# Patient Record
Sex: FEMALE | Race: WHITE | HISPANIC OR LATINO | URBAN - METROPOLITAN AREA
[De-identification: names, ages, dates, MRNs, and addresses within clinical notes are randomized per-mention and may not be internally consistent; named-entity substitution may affect disease eponyms.]

---

## 2018-06-19 ENCOUNTER — INPATIENT (INPATIENT)
Facility: HOSPITAL | Age: 73
LOS: 0 days | Discharge: HOME | DRG: 313 | End: 2018-06-20
Attending: INTERNAL MEDICINE | Admitting: INTERNAL MEDICINE
Payer: MEDICARE

## 2018-06-19 ENCOUNTER — TRANSCRIPTION ENCOUNTER (OUTPATIENT)
Age: 73
End: 2018-06-19

## 2018-06-19 VITALS
RESPIRATION RATE: 18 BRPM | HEART RATE: 60 BPM | TEMPERATURE: 98 F | DIASTOLIC BLOOD PRESSURE: 70 MMHG | SYSTOLIC BLOOD PRESSURE: 124 MMHG | OXYGEN SATURATION: 97 %

## 2018-06-19 VITALS
HEART RATE: 60 BPM | DIASTOLIC BLOOD PRESSURE: 70 MMHG | RESPIRATION RATE: 18 BRPM | OXYGEN SATURATION: 97 % | SYSTOLIC BLOOD PRESSURE: 127 MMHG | HEIGHT: 63 IN | TEMPERATURE: 98 F | WEIGHT: 223.99 LBS

## 2018-06-19 DIAGNOSIS — E78.5 HYPERLIPIDEMIA, UNSPECIFIED: ICD-10-CM

## 2018-06-19 DIAGNOSIS — I10 ESSENTIAL (PRIMARY) HYPERTENSION: ICD-10-CM

## 2018-06-19 DIAGNOSIS — Z96.653 PRESENCE OF ARTIFICIAL KNEE JOINT, BILATERAL: Chronic | ICD-10-CM

## 2018-06-19 DIAGNOSIS — I20.8 OTHER FORMS OF ANGINA PECTORIS: ICD-10-CM

## 2018-06-19 LAB
ALBUMIN SERPL ELPH-MCNC: 4.1 G/DL — SIGNIFICANT CHANGE UP (ref 3.3–5)
ALP SERPL-CCNC: 71 U/L — SIGNIFICANT CHANGE UP (ref 40–120)
ALT FLD-CCNC: 19 U/L — SIGNIFICANT CHANGE UP (ref 10–45)
ANION GAP SERPL CALC-SCNC: 11 MMOL/L — SIGNIFICANT CHANGE UP (ref 5–17)
APPEARANCE UR: CLEAR — SIGNIFICANT CHANGE UP
AST SERPL-CCNC: 24 U/L — SIGNIFICANT CHANGE UP (ref 10–40)
BASOPHILS NFR BLD AUTO: 0.3 % — SIGNIFICANT CHANGE UP (ref 0–2)
BILIRUB SERPL-MCNC: 0.6 MG/DL — SIGNIFICANT CHANGE UP (ref 0.2–1.2)
BILIRUB UR-MCNC: NEGATIVE — SIGNIFICANT CHANGE UP
BUN SERPL-MCNC: 25 MG/DL — HIGH (ref 7–23)
CALCIUM SERPL-MCNC: 9.4 MG/DL — SIGNIFICANT CHANGE UP (ref 8.4–10.5)
CHLORIDE SERPL-SCNC: 101 MMOL/L — SIGNIFICANT CHANGE UP (ref 96–108)
CK MB CFR SERPL CALC: 1.5 NG/ML — SIGNIFICANT CHANGE UP (ref 0–6.7)
CK SERPL-CCNC: 62 U/L — SIGNIFICANT CHANGE UP (ref 25–170)
CO2 SERPL-SCNC: 27 MMOL/L — SIGNIFICANT CHANGE UP (ref 22–31)
COLOR SPEC: YELLOW — SIGNIFICANT CHANGE UP
CREAT SERPL-MCNC: 0.88 MG/DL — SIGNIFICANT CHANGE UP (ref 0.5–1.3)
DIFF PNL FLD: ABNORMAL
EOSINOPHIL NFR BLD AUTO: 1.4 % — SIGNIFICANT CHANGE UP (ref 0–6)
GLUCOSE SERPL-MCNC: 94 MG/DL — SIGNIFICANT CHANGE UP (ref 70–99)
GLUCOSE UR QL: NEGATIVE — SIGNIFICANT CHANGE UP
HCT VFR BLD CALC: 41.5 % — SIGNIFICANT CHANGE UP (ref 34.5–45)
HGB BLD-MCNC: 14 G/DL — SIGNIFICANT CHANGE UP (ref 11.5–15.5)
KETONES UR-MCNC: NEGATIVE — SIGNIFICANT CHANGE UP
LEUKOCYTE ESTERASE UR-ACNC: ABNORMAL
LYMPHOCYTES # BLD AUTO: 21.4 % — SIGNIFICANT CHANGE UP (ref 13–44)
MCHC RBC-ENTMCNC: 29 PG — SIGNIFICANT CHANGE UP (ref 27–34)
MCHC RBC-ENTMCNC: 33.7 G/DL — SIGNIFICANT CHANGE UP (ref 32–36)
MCV RBC AUTO: 86.1 FL — SIGNIFICANT CHANGE UP (ref 80–100)
MONOCYTES NFR BLD AUTO: 7.6 % — SIGNIFICANT CHANGE UP (ref 2–14)
NEUTROPHILS NFR BLD AUTO: 69.3 % — SIGNIFICANT CHANGE UP (ref 43–77)
NITRITE UR-MCNC: NEGATIVE — SIGNIFICANT CHANGE UP
NT-PROBNP SERPL-SCNC: 96 PG/ML — SIGNIFICANT CHANGE UP (ref 0–300)
PH UR: 6 — SIGNIFICANT CHANGE UP (ref 5–8)
PLATELET # BLD AUTO: 171 K/UL — SIGNIFICANT CHANGE UP (ref 150–400)
POTASSIUM SERPL-MCNC: 4.8 MMOL/L — SIGNIFICANT CHANGE UP (ref 3.5–5.3)
POTASSIUM SERPL-SCNC: 4.8 MMOL/L — SIGNIFICANT CHANGE UP (ref 3.5–5.3)
PROT SERPL-MCNC: 7.4 G/DL — SIGNIFICANT CHANGE UP (ref 6–8.3)
PROT UR-MCNC: NEGATIVE MG/DL — SIGNIFICANT CHANGE UP
RBC # BLD: 4.82 M/UL — SIGNIFICANT CHANGE UP (ref 3.8–5.2)
RBC # FLD: 14.3 % — SIGNIFICANT CHANGE UP (ref 10.3–16.9)
SODIUM SERPL-SCNC: 139 MMOL/L — SIGNIFICANT CHANGE UP (ref 135–145)
SP GR SPEC: <=1.005 — SIGNIFICANT CHANGE UP (ref 1–1.03)
TROPONIN T SERPL-MCNC: <0.01 NG/ML — SIGNIFICANT CHANGE UP (ref 0–0.01)
TROPONIN T SERPL-MCNC: <0.01 NG/ML — SIGNIFICANT CHANGE UP (ref 0–0.01)
TSH SERPL-MCNC: 1.59 UIU/ML — SIGNIFICANT CHANGE UP (ref 0.35–4.94)
UROBILINOGEN FLD QL: 0.2 E.U./DL — SIGNIFICANT CHANGE UP
WBC # BLD: 8.6 K/UL — SIGNIFICANT CHANGE UP (ref 3.8–10.5)
WBC # FLD AUTO: 8.6 K/UL — SIGNIFICANT CHANGE UP (ref 3.8–10.5)

## 2018-06-19 PROCEDURE — 99285 EMERGENCY DEPT VISIT HI MDM: CPT | Mod: 25

## 2018-06-19 PROCEDURE — 75574 CT ANGIO HRT W/3D IMAGE: CPT | Mod: 26

## 2018-06-19 PROCEDURE — 93010 ELECTROCARDIOGRAM REPORT: CPT

## 2018-06-19 PROCEDURE — 71045 X-RAY EXAM CHEST 1 VIEW: CPT | Mod: 26

## 2018-06-19 RX ORDER — PROPRANOLOL HCL 160 MG
120 CAPSULE, EXTENDED RELEASE 24HR ORAL DAILY
Qty: 0 | Refills: 0 | Status: DISCONTINUED | OUTPATIENT
Start: 2018-06-19 | End: 2018-06-20

## 2018-06-19 RX ORDER — LOSARTAN POTASSIUM 100 MG/1
100 TABLET, FILM COATED ORAL DAILY
Qty: 0 | Refills: 0 | Status: DISCONTINUED | OUTPATIENT
Start: 2018-06-19 | End: 2018-06-20

## 2018-06-19 RX ORDER — METOPROLOL TARTRATE 50 MG
25 TABLET ORAL ONCE
Qty: 0 | Refills: 0 | Status: COMPLETED | OUTPATIENT
Start: 2018-06-19 | End: 2018-06-19

## 2018-06-19 RX ORDER — ATORVASTATIN CALCIUM 80 MG/1
20 TABLET, FILM COATED ORAL AT BEDTIME
Qty: 0 | Refills: 0 | Status: DISCONTINUED | OUTPATIENT
Start: 2018-06-19 | End: 2018-06-20

## 2018-06-19 RX ADMIN — Medication 25 MILLIGRAM(S): at 15:49

## 2018-06-19 NOTE — ED ADULT NURSE NOTE - OBJECTIVE STATEMENT
Pt w/ PMH of HLD and HTN presents to ED c/o intermittent CP x2 weeks worse on exertion.  Pt also elicits SOB when walking up stairs.  Pt denies dizziness, N/V, chills/fever, cough or unusual swelling.  Pt is pending labs and eval by [facility].

## 2018-06-19 NOTE — ED ADULT NURSE NOTE - CHPI ED SYMPTOMS NEG
no back pain/no syncope/no chills/no dizziness/no fever/no vomiting/no diaphoresis/no chest pain/no nausea/no cough

## 2018-06-19 NOTE — DISCHARGE NOTE ADULT - PATIENT PORTAL LINK FT
You can access the [facility] Patient Portal, offered by [facility], by registering with the following website: [email or web address]

## 2018-06-19 NOTE — H&P ADULT - PROBLEM SELECTOR PLAN 1
currently chest pain free, EKG LBBB, cardiac enzymes negative x 1 set. NPO for CT Angio Coronaries. currently chest pain free, EKG LBBB, cardiac enzymes negative x 1 set. NPO for CT Angio Coronaries this evening, if abnormal plan for cardiac catheterization with Dr. Whittington in AM. Will F/U CTA results and inform Dr. Whittington. currently chest pain free, EKG LBBB, cardiac enzymes negative x 1 set. NPO for CT Angio Coronaries this evening, will F/U CTA results and inform Dr. Whittington.

## 2018-06-19 NOTE — DISCHARGE NOTE ADULT - CARE PLAN
Principal Discharge DX:	Anginal chest pain at rest  Goal:	Your chest pain is likely noncardiac in origin. You had an EKG which revealed a left bundle branch block, however your cardiac markers were negative for injury to the muscle of the heart.  Assessment and plan of treatment:	You also had a CT Angiogram of your coronary arteries which revealed calcium score is minimally elevated at 2 Agatston units, which is at the 38th percentile, adjusted for age, gender and race. Non-obstructive lesion in the proximal left circumflex artery. Remaining coronary arteries are normal.  --Continue to follow-up with your cardiologist and see your PMD to evaluate noncardiac origin of your symptoms.  Secondary Diagnosis:	HTN (hypertension)  Goal:	Continue to follow low sodium diet.  Assessment and plan of treatment:	--Continue Inderall 120mg by mouth daily and Benicar 40mg by mouth daily  Secondary Diagnosis:	HLD (hyperlipidemia)  Assessment and plan of treatment:	--Continue Lipitor 20mg by mouth at bedtime

## 2018-06-19 NOTE — H&P ADULT - HISTORY OF PRESENT ILLNESS
73 yr old obese F with PMHx of HTN, hyperlipidemia, presents to [facility] ED 6/19/18 c/o intermittent exertional chest pain and SOB x 2 weeks.  Patient describes it as being nonradiating left sided chest pressure 5/10 in severity, associated with SOB. Symptoms brought on by exertion (ambulating >1 city block or climbing 10 steps). Patient denies any N/V, diaphoresis, dizziness, palpitations, PND, orthopnea or LE edema. Patient was seen a few days ago by Dr. Singh was noted to have new LBBB on EKG and was recommended to come to ED, however patient waited until symptoms reoccurred today. Patient denies recent cardiac work-up.   In ED, BP: 127/70, HR: 60, RR:18, Temp: 98F, O2 sat: 97% RA. EKG revealed SR@60BPM with LBBB. Cardiac enzymes negative x 1 set. CXR unremarkable.   Patient currently stable and now admitted to [facility] for cardiac telemetry, serial enzymes and further cardiac work-up. 73 yr old obese F with PMHx of HTN, hyperlipidemia, presents to [facility] ED 6/19/18 c/o intermittent chest pain and SOB x 2 weeks. Patient describes it as being nonradiating left sided chest pressure 5/10 in severity, associated with SOB. Symptoms initially brought on by exertion (ambulating >1 city block or climbing 10 steps), however recently also occurring at rest. Patient denies any N/V, diaphoresis, dizziness, palpitations, PND, orthopnea or LE edema. Patient was seen a few days ago by Dr. Singh was noted to have new LBBB on EKG and was recommended to come to ED, however patient waited until symptoms reoccurred today. Patient denies recent cardiac work-up, states last stress test was ~2 yrs ago was "normal".  In ED, BP: 127/70, HR: 60, RR:18, Temp: 98F, O2 sat: 97% RA. EKG revealed SR@60BPM with LBBB. Cardiac enzymes negative x 1 set. CXR unremarkable.   Patient currently stable and now admitted to [facility] for cardiac telemetry, serial enzymes and further cardiac work-up.

## 2018-06-19 NOTE — DISCHARGE NOTE ADULT - HOSPITAL COURSE
73 yr old obese F with PMHx of HTN, hyperlipidemia, presents to [facility] ED 6/19/18 c/o intermittent chest pain and SOB x 2 weeks. Patient describes it as being nonradiating left sided chest pressure 5/10 in severity, associated with SOB. Symptoms initially brought on by exertion (ambulating >1 city block or climbing 10 steps), however recently also occurring at rest. Patient denies any N/V, diaphoresis, dizziness, palpitations, PND, orthopnea or LE edema. Patient was seen a few days ago by Dr. Singh was noted to have new LBBB on EKG and was recommended to come to ED, however patient waited until symptoms reoccurred today. Patient denies recent cardiac work-up, states last stress test was ~2 yrs ago was "normal".  In ED, BP: 127/70, HR: 60, RR:18, Temp: 98F, O2 sat: 97% RA. EKG revealed SR@60BPM with LBBB. Cardiac enzymes negative x 1 set. CXR unremarkable. Patient subsequently underwent a CT Angio of Coronaries which revealed calcium score is minimally elevated at 2 Agatston units, which is at the 38th percentile, adjusted for age, gender and race.Non-obstructive stenosis in the proximal LCX. Remaining coronary segments appear normal  Patients symptoms likely noncardiac in origin and deemed stable for discharge as per Dr. Whittington and to follow-up with Dr. Singh in 1-2 weeks.

## 2018-06-19 NOTE — ED ADULT TRIAGE NOTE - CHIEF COMPLAINT QUOTE
pt states she has been feeling tired over the past 5 days, also reports palpitations when walking. denies sob, cp, dizziness.

## 2018-06-19 NOTE — ED ADULT NURSE NOTE - CHIEF COMPLAINT QUOTE
pt states she has been feeling tired over the past 5 days, also reports palpitations when walking. denies sob, cp, dizziness.
Simple: Patient demonstrates quick and easy understanding/Verbalized Understanding

## 2018-06-19 NOTE — ED PROVIDER NOTE - PHYSICAL EXAMINATION
VITAL SIGNS: I have reviewed nursing notes and confirm.  CONSTITUTIONAL: Well-developed; well-nourished obese elderly female lying comfortably in stretcher moving all ext speaking clearly in complete sentences; in no acute distress.  SKIN: Agree with RN documentation regarding decubitus evaluation. Remainder of skin exam is warm and dry, no acute rash.  HEAD: Normocephalic; atraumatic.  EYES: PERRL, EOM intact; conjunctiva and sclera clear.  ENT: No nasal discharge; airway clear.  NECK: Supple; non tender.  CARD: S1, S2 normal; no murmurs, gallops, or rubs. RRR  RESP: No wheezes, rales or rhonchi. CTA w/good excursion, no inc wob  ABD: Normal bowel sounds; soft; non-distended; non-tender  EXT: Normal ROM. No clubbing, cyanosis or edema.  LYMPH: No acute cervical adenopathy.  NEURO: Alert, oriented. Grossly unremarkable.  PSYCH: Cooperative, appropriate.

## 2018-06-19 NOTE — ED PROVIDER NOTE - OBJECTIVE STATEMENT
72 y/o F w/hx HLD, HTN, obesity, non-smoker w/no known cardiac disease, last Stress Test 2 yrs ago, p/w intermittent L-sided chest pressure on/off for 2 weeks, exertional, with associated SOB while walking up stairs. Notes mild decreased exercise tolerance. No LE pain/swelling. No n/v or abd pain. No fever/chills/cough or recent illness. Seen at PMD's office 4 days ago, Dr. Singh, with noted LBBB, unsure if new or not, referred to ED for further evaluation. However, pt began to feel better so did not immediately seek further evaluation. Today pt's pressure sx are more prominent, present at time of interview.

## 2018-06-19 NOTE — ED PROVIDER NOTE - MEDICAL DECISION MAKING DETAILS
+ active chest discomfort w/new LBBB and cardiac risk factors, negative trop, HDS, admitting for cardiac CTA and possible catheterization. Given ASA. Admitting to [facility] service.

## 2018-06-19 NOTE — DISCHARGE NOTE ADULT - MEDICATION SUMMARY - MEDICATIONS TO TAKE
I will START or STAY ON the medications listed below when I get home from the hospital:    Benicar 40 mg oral tablet  -- 1 tab(s) by mouth once a day  -- Indication: For Blood pressure    Inderal  mg oral capsule, extended release  -- 1 cap(s) by mouth once a day  -- Indication: For Blood pressure    Lipitor 20 mg oral tablet  -- 1 tab(s) by mouth once a day  -- Indication: For Cholesterol

## 2018-06-19 NOTE — DISCHARGE NOTE ADULT - PLAN OF CARE
Your chest pain is likely noncardiac in origin. You had an EKG which revealed a left bundle branch block, however your cardiac markers were negative for injury to the muscle of the heart. You also had a CT Angiogram of your coronary arteries which revealed calcium score is minimally elevated at 2 Agatston units, which is at the 38th percentile, adjusted for age, gender and race. Non-obstructive lesion in the proximal left circumflex artery. Remaining coronary arteries are normal.  --Continue to follow-up with your cardiologist and see your PMD to evaluate noncardiac origin of your symptoms. Continue to follow low sodium diet. --Continue Inderall 120mg by mouth daily and Benicar 40mg by mouth daily --Continue Lipitor 20mg by mouth at bedtime

## 2018-06-20 PROCEDURE — 85025 COMPLETE CBC W/AUTO DIFF WBC: CPT

## 2018-06-20 PROCEDURE — 80053 COMPREHEN METABOLIC PANEL: CPT

## 2018-06-20 PROCEDURE — 84484 ASSAY OF TROPONIN QUANT: CPT

## 2018-06-20 PROCEDURE — G0378: CPT

## 2018-06-20 PROCEDURE — 82550 ASSAY OF CK (CPK): CPT

## 2018-06-20 PROCEDURE — 36415 COLL VENOUS BLD VENIPUNCTURE: CPT

## 2018-06-20 PROCEDURE — 75574 CT ANGIO HRT W/3D IMAGE: CPT

## 2018-06-20 PROCEDURE — 83880 ASSAY OF NATRIURETIC PEPTIDE: CPT

## 2018-06-20 PROCEDURE — 71045 X-RAY EXAM CHEST 1 VIEW: CPT

## 2018-06-20 PROCEDURE — 81001 URINALYSIS AUTO W/SCOPE: CPT

## 2018-06-20 PROCEDURE — 84443 ASSAY THYROID STIM HORMONE: CPT

## 2018-06-20 PROCEDURE — 99285 EMERGENCY DEPT VISIT HI MDM: CPT | Mod: 25

## 2018-06-20 PROCEDURE — 93005 ELECTROCARDIOGRAM TRACING: CPT

## 2018-06-20 PROCEDURE — 82553 CREATINE MB FRACTION: CPT

## 2018-06-25 DIAGNOSIS — E66.9 OBESITY, UNSPECIFIED: ICD-10-CM

## 2018-06-25 DIAGNOSIS — R07.89 OTHER CHEST PAIN: ICD-10-CM

## 2018-06-25 DIAGNOSIS — I10 ESSENTIAL (PRIMARY) HYPERTENSION: ICD-10-CM

## 2018-06-25 DIAGNOSIS — I44.7 LEFT BUNDLE-BRANCH BLOCK, UNSPECIFIED: ICD-10-CM

## 2018-09-19 ENCOUNTER — OTHER (OUTPATIENT)
Age: 73
End: 2018-09-19

## 2020-10-13 PROBLEM — I10 ESSENTIAL (PRIMARY) HYPERTENSION: Chronic | Status: ACTIVE | Noted: 2018-06-19

## 2020-10-13 PROBLEM — E78.5 HYPERLIPIDEMIA, UNSPECIFIED: Chronic | Status: ACTIVE | Noted: 2018-06-19

## 2020-10-13 PROBLEM — K57.92 DIVERTICULITIS OF INTESTINE, PART UNSPECIFIED, WITHOUT PERFORATION OR ABSCESS WITHOUT BLEEDING: Chronic | Status: ACTIVE | Noted: 2018-06-19

## 2020-10-22 ENCOUNTER — NON-APPOINTMENT (OUTPATIENT)
Age: 75
End: 2020-10-22

## 2020-10-22 ENCOUNTER — LABORATORY RESULT (OUTPATIENT)
Age: 75
End: 2020-10-22

## 2020-10-22 ENCOUNTER — APPOINTMENT (OUTPATIENT)
Dept: INTERNAL MEDICINE | Facility: CLINIC | Age: 75
End: 2020-10-22
Payer: MEDICARE

## 2020-10-22 VITALS
SYSTOLIC BLOOD PRESSURE: 145 MMHG | BODY MASS INDEX: 42.36 KG/M2 | OXYGEN SATURATION: 97 % | DIASTOLIC BLOOD PRESSURE: 76 MMHG | HEART RATE: 64 BPM | TEMPERATURE: 96.6 F | WEIGHT: 227.28 LBS | HEIGHT: 61.42 IN

## 2020-10-22 DIAGNOSIS — Z78.9 OTHER SPECIFIED HEALTH STATUS: ICD-10-CM

## 2020-10-22 DIAGNOSIS — Z23 ENCOUNTER FOR IMMUNIZATION: ICD-10-CM

## 2020-10-22 PROCEDURE — 90662 IIV NO PRSV INCREASED AG IM: CPT

## 2020-10-22 PROCEDURE — 99072 ADDL SUPL MATRL&STAF TM PHE: CPT

## 2020-10-22 PROCEDURE — G0008: CPT

## 2020-10-22 PROCEDURE — G0439: CPT

## 2020-10-22 PROCEDURE — 36415 COLL VENOUS BLD VENIPUNCTURE: CPT

## 2020-10-27 PROBLEM — Z78.9 CURRENT NON-SMOKER: Status: ACTIVE | Noted: 2020-10-27

## 2020-10-27 PROBLEM — Z23 ENCOUNTER FOR IMMUNIZATION: Status: ACTIVE | Noted: 2020-10-27

## 2020-10-27 PROBLEM — Z78.9 CONSUMES ALCOHOL OCCASIONALLY: Status: ACTIVE | Noted: 2020-10-27

## 2020-10-27 PROBLEM — Z78.9 KNOWN HEALTH PROBLEMS: NONE: Status: RESOLVED | Noted: 2020-10-27 | Resolved: 2020-10-27

## 2020-10-27 NOTE — HISTORY OF PRESENT ILLNESS
[FreeTextEntry1] : 75-year-old female presents for Medicare annual wellness [de-identified] : 75-year-old female past medical history of diverticulitis and hypertension presents for Medicare annual wellness visit. Watching diet eating high fiber, denies any abdominal pain diarrhea nausea vomiting. Denies any blood in the stool. [place] for hypertension denies any chest pain chest tightness shortness of breath palpitations

## 2020-10-27 NOTE — HEALTH RISK ASSESSMENT
[Good] : ~his/her~  mood as  good [No] : No [No falls in past year] : Patient reported no falls in the past year [0] : 2) Feeling down, depressed, or hopeless: Not at all (0) [Patient reported mammogram was normal] : Patient reported mammogram was normal [Patient reported bone density results were normal] : Patient reported bone density results were normal [Patient reported colonoscopy was normal] : Patient reported colonoscopy was normal [None] : None [With Significant Other] : lives with significant other [Feels Safe at Home] : Feels safe at home [Fully functional (bathing, dressing, toileting, transferring, walking, feeding)] : Fully functional (bathing, dressing, toileting, transferring, walking, feeding) [Fully functional (using the telephone, shopping, preparing meals, housekeeping, doing laundry, using] : Fully functional and needs no help or supervision to perform IADLs (using the telephone, shopping, preparing meals, housekeeping, doing laundry, using transportation, managing medications and managing finances) [Reports normal functional visual acuity (ie: able to read med bottle)] : Reports normal functional visual acuity [Smoke Detector] : smoke detector [Carbon Monoxide Detector] : carbon monoxide detector [Seat Belt] :  uses seat belt [Sunscreen] : uses sunscreen [Caregiver Concerns] : has caregiver concerns [Patient/Caregiver not ready to engage] : Patient/Caregiver not ready to engage [Designated Healthcare Proxy] : Designated healthcare proxy [FreeTextEntry1] : health maintenance [] : No [de-identified] : none [de-identified] : cardiology [Change in mental status noted] : No change in mental status noted [Language] : denies difficulty with language [Behavior] : denies difficulty with behavior [Learning/Retaining New Information] : denies difficulty learning/retaining new information [Handling Complex Tasks] : denies difficulty handling complex tasks [Reasoning] : denies difficulty with reasoning [Spatial Ability and Orientation] : denies difficulty with spatial ability and orientation [Reports changes in hearing] : Reports no changes in hearing [Reports changes in vision] : Reports no changes in vision [Reports changes in dental health] : Reports no changes in dental health [Guns at Home] : no guns at home [Travel to Developing Areas] : does not  travel to developing areas [TB Exposure] : is not being exposed to tuberculosis [MammogramDate] : 1/2020 [BoneDensityDate] : 1/2020 [ColonoscopyDate] : 1/2018 [AdvancecareDate] : 10/2020

## 2020-10-27 NOTE — ASSESSMENT
[FreeTextEntry1] : Patient presents for Medicare annual wellness visit, blood work done at the office today blood pressure acceptable, check other risk factors including lipid panel and hemoglobin A1c. Up to date with colonoscopy given diverticulitis advised high-fiber diet. To retrieve old records for pneumonia vaccines

## 2020-10-29 LAB
25(OH)D3 SERPL-MCNC: 27.2 NG/ML
ALBUMIN SERPL ELPH-MCNC: 4.7 G/DL
ALP BLD-CCNC: 103 U/L
ALT SERPL-CCNC: 15 U/L
ANION GAP SERPL CALC-SCNC: 16 MMOL/L
APPEARANCE: CLEAR
APPEARANCE: CLEAR
AST SERPL-CCNC: 17 U/L
BACTERIA: ABNORMAL
BASOPHILS # BLD AUTO: 0.03 K/UL
BASOPHILS NFR BLD AUTO: 0.3 %
BILIRUB SERPL-MCNC: 0.5 MG/DL
BILIRUBIN URINE: NEGATIVE
BILIRUBIN URINE: NEGATIVE
BLOOD URINE: NORMAL
BLOOD URINE: NORMAL
BUN SERPL-MCNC: 22 MG/DL
CALCIUM SERPL-MCNC: 10 MG/DL
CHLORIDE SERPL-SCNC: 99 MMOL/L
CHOLEST SERPL-MCNC: 160 MG/DL
CO2 SERPL-SCNC: 25 MMOL/L
COLOR: NORMAL
COLOR: NORMAL
CREAT SERPL-MCNC: 0.88 MG/DL
EOSINOPHIL # BLD AUTO: 0.19 K/UL
EOSINOPHIL NFR BLD AUTO: 2.1 %
ESTIMATED AVERAGE GLUCOSE: 128 MG/DL
GLUCOSE QUALITATIVE U: NEGATIVE
GLUCOSE QUALITATIVE U: NEGATIVE
GLUCOSE SERPL-MCNC: 84 MG/DL
HBA1C MFR BLD HPLC: 6.1 %
HCT VFR BLD CALC: 50.5 %
HDLC SERPL-MCNC: 36 MG/DL
HGB BLD-MCNC: 15 G/DL
HYALINE CASTS: 0 /LPF
IMM GRANULOCYTES NFR BLD AUTO: 0.2 %
KETONES URINE: NEGATIVE
KETONES URINE: NEGATIVE
LDLC SERPL CALC-MCNC: 81 MG/DL
LEUKOCYTE ESTERASE URINE: ABNORMAL
LEUKOCYTE ESTERASE URINE: ABNORMAL
LYMPHOCYTES # BLD AUTO: 1.61 K/UL
LYMPHOCYTES NFR BLD AUTO: 17.6 %
MAN DIFF?: NORMAL
MCHC RBC-ENTMCNC: 27.4 PG
MCHC RBC-ENTMCNC: 29.7 GM/DL
MCV RBC AUTO: 92.2 FL
MICROSCOPIC-UA: NORMAL
MONOCYTES # BLD AUTO: 0.91 K/UL
MONOCYTES NFR BLD AUTO: 9.9 %
NEUTROPHILS # BLD AUTO: 6.4 K/UL
NEUTROPHILS NFR BLD AUTO: 69.9 %
NITRITE URINE: POSITIVE
NITRITE URINE: POSITIVE
NONHDLC SERPL-MCNC: 123 MG/DL
PH URINE: 6
PH URINE: 6
PLATELET # BLD AUTO: 220 K/UL
POTASSIUM SERPL-SCNC: 4.9 MMOL/L
PROT SERPL-MCNC: 7.3 G/DL
PROTEIN URINE: NEGATIVE
PROTEIN URINE: NEGATIVE
RBC # BLD: 5.48 M/UL
RBC # FLD: 14.6 %
RED BLOOD CELLS URINE: 2 /HPF
SODIUM SERPL-SCNC: 140 MMOL/L
SPECIFIC GRAVITY URINE: 1.01
SPECIFIC GRAVITY URINE: 1.01
SQUAMOUS EPITHELIAL CELLS: 2 /HPF
TRIGL SERPL-MCNC: 213 MG/DL
TSH SERPL-ACNC: 3.15 UIU/ML
UROBILINOGEN URINE: NORMAL
UROBILINOGEN URINE: NORMAL
VIT B12 SERPL-MCNC: 542 PG/ML
WBC # FLD AUTO: 9.16 K/UL
WHITE BLOOD CELLS URINE: 10 /HPF

## 2021-01-10 NOTE — ED ADULT TRIAGE NOTE - AS TEMP SITE
Patient is resting comfortably with eyes closed and no distress noted at this time. Patient has call light within reach and is encouraged to ask any questions or call for any assistance. All questions are entertained, encouraged, and answered to the best of my ability.       Geoffrey Mock RN  01/10/21 7100 oral

## 2021-04-20 ENCOUNTER — APPOINTMENT (OUTPATIENT)
Dept: INTERNAL MEDICINE | Facility: CLINIC | Age: 76
End: 2021-04-20
Payer: MEDICARE

## 2021-04-20 ENCOUNTER — LABORATORY RESULT (OUTPATIENT)
Age: 76
End: 2021-04-20

## 2021-04-20 VITALS
WEIGHT: 220.88 LBS | BODY MASS INDEX: 42.8 KG/M2 | TEMPERATURE: 97.8 F | HEART RATE: 61 BPM | SYSTOLIC BLOOD PRESSURE: 147 MMHG | HEIGHT: 60.04 IN | DIASTOLIC BLOOD PRESSURE: 80 MMHG | OXYGEN SATURATION: 98 %

## 2021-04-20 DIAGNOSIS — R10.11 RIGHT UPPER QUADRANT PAIN: ICD-10-CM

## 2021-04-20 PROCEDURE — 99214 OFFICE O/P EST MOD 30 MIN: CPT | Mod: 25

## 2021-04-20 PROCEDURE — 36415 COLL VENOUS BLD VENIPUNCTURE: CPT

## 2021-04-20 PROCEDURE — 99072 ADDL SUPL MATRL&STAF TM PHE: CPT

## 2021-04-24 NOTE — HISTORY OF PRESENT ILLNESS
[FreeTextEntry1] : Patient presents for follow-up of chronic disease management [de-identified] : Sometimes gets palpitations triggered intermittently does not wake up from sleep not associated with lightheadedness dizziness chest pain chest tightness.  Denies any syncope.  Denies any caffeine use sometimes also gets abdominal pain, in the right upper quadrant area does not radiate sometimes associated with food denies any nausea vomiting melena.  To date with colonoscopy. Recently had workup with cardiologist.

## 2021-04-24 NOTE — ASSESSMENT
[FreeTextEntry1] : We will check electrolytes and magnesium for palpitations has seen cardiologist, no associated symptoms right upper quadrant pain Covid ultrasound H. pylori to be checked.  We will check thyroid function tests.  Discussed importance of stress management.

## 2021-04-26 DIAGNOSIS — A04.8 OTHER SPECIFIED BACTERIAL INTESTINAL INFECTIONS: ICD-10-CM

## 2021-04-26 LAB
25(OH)D3 SERPL-MCNC: 37.5 NG/ML
ALBUMIN SERPL ELPH-MCNC: 4.5 G/DL
ALP BLD-CCNC: 88 U/L
ALT SERPL-CCNC: 15 U/L
ANION GAP SERPL CALC-SCNC: 13 MMOL/L
APPEARANCE: CLEAR
APPEARANCE: CLEAR
AST SERPL-CCNC: 18 U/L
BACTERIA: ABNORMAL
BASOPHILS # BLD AUTO: 0.06 K/UL
BASOPHILS NFR BLD AUTO: 0.8 %
BILIRUB SERPL-MCNC: 0.4 MG/DL
BILIRUBIN URINE: NEGATIVE
BILIRUBIN URINE: NEGATIVE
BLOOD URINE: NEGATIVE
BLOOD URINE: NEGATIVE
BUN SERPL-MCNC: 31 MG/DL
CALCIUM SERPL-MCNC: 10 MG/DL
CHLORIDE SERPL-SCNC: 101 MMOL/L
CHOLEST SERPL-MCNC: 143 MG/DL
CO2 SERPL-SCNC: 25 MMOL/L
COLOR: NORMAL
COLOR: NORMAL
CREAT SERPL-MCNC: 0.93 MG/DL
EOSINOPHIL # BLD AUTO: 0.13 K/UL
EOSINOPHIL NFR BLD AUTO: 1.7 %
ESTIMATED AVERAGE GLUCOSE: 117 MG/DL
GLUCOSE QUALITATIVE U: NEGATIVE
GLUCOSE QUALITATIVE U: NEGATIVE
GLUCOSE SERPL-MCNC: 79 MG/DL
HBA1C MFR BLD HPLC: 5.7 %
HCT VFR BLD CALC: 47.4 %
HDLC SERPL-MCNC: 39 MG/DL
HGB BLD-MCNC: 14.8 G/DL
HYALINE CASTS: 1 /LPF
IMM GRANULOCYTES NFR BLD AUTO: 0.3 %
KETONES URINE: NEGATIVE
KETONES URINE: NEGATIVE
LDLC SERPL CALC-MCNC: 78 MG/DL
LEUKOCYTE ESTERASE URINE: ABNORMAL
LEUKOCYTE ESTERASE URINE: ABNORMAL
LYMPHOCYTES # BLD AUTO: 1.69 K/UL
LYMPHOCYTES NFR BLD AUTO: 22.7 %
MAGNESIUM SERPL-MCNC: 2 MG/DL
MAN DIFF?: NORMAL
MCHC RBC-ENTMCNC: 28.2 PG
MCHC RBC-ENTMCNC: 31.2 GM/DL
MCV RBC AUTO: 90.3 FL
MICROSCOPIC-UA: NORMAL
MONOCYTES # BLD AUTO: 0.71 K/UL
MONOCYTES NFR BLD AUTO: 9.5 %
NEUTROPHILS # BLD AUTO: 4.85 K/UL
NEUTROPHILS NFR BLD AUTO: 65 %
NITRITE URINE: POSITIVE
NITRITE URINE: POSITIVE
NONHDLC SERPL-MCNC: 104 MG/DL
PH URINE: 6
PH URINE: 6
PLATELET # BLD AUTO: 195 K/UL
POTASSIUM SERPL-SCNC: 5.2 MMOL/L
PROT SERPL-MCNC: 7.2 G/DL
PROTEIN URINE: NEGATIVE
PROTEIN URINE: NEGATIVE
RBC # BLD: 5.25 M/UL
RBC # FLD: 15.1 %
RED BLOOD CELLS URINE: 2 /HPF
SODIUM SERPL-SCNC: 140 MMOL/L
SPECIFIC GRAVITY URINE: 1.02
SPECIFIC GRAVITY URINE: 1.02
SQUAMOUS EPITHELIAL CELLS: 3 /HPF
TRIGL SERPL-MCNC: 130 MG/DL
TSH SERPL-ACNC: 2.82 UIU/ML
TSH SERPL-ACNC: 2.82 UIU/ML
UREA BREATH TEST QL: POSITIVE
UROBILINOGEN URINE: NORMAL
UROBILINOGEN URINE: NORMAL
VIT B12 SERPL-MCNC: 427 PG/ML
WBC # FLD AUTO: 7.46 K/UL
WHITE BLOOD CELLS URINE: 7 /HPF

## 2021-04-28 ENCOUNTER — APPOINTMENT (OUTPATIENT)
Dept: ULTRASOUND IMAGING | Facility: CLINIC | Age: 76
End: 2021-04-28

## 2021-08-20 ENCOUNTER — APPOINTMENT (OUTPATIENT)
Dept: INTERNAL MEDICINE | Facility: CLINIC | Age: 76
End: 2021-08-20

## 2021-09-16 ENCOUNTER — APPOINTMENT (OUTPATIENT)
Dept: INTERNAL MEDICINE | Facility: CLINIC | Age: 76
End: 2021-09-16
Payer: MEDICARE

## 2021-09-16 VITALS
TEMPERATURE: 96.1 F | WEIGHT: 217.03 LBS | HEART RATE: 65 BPM | BODY MASS INDEX: 42.61 KG/M2 | DIASTOLIC BLOOD PRESSURE: 76 MMHG | OXYGEN SATURATION: 97 % | SYSTOLIC BLOOD PRESSURE: 155 MMHG | HEIGHT: 59.84 IN

## 2021-09-16 PROCEDURE — 99213 OFFICE O/P EST LOW 20 MIN: CPT

## 2021-09-18 NOTE — HISTORY OF PRESENT ILLNESS
[FreeTextEntry8] : Patient presents to the office for dizziness\par Occurs when turning the head to the right\par Last for a few seconds\par Denies any cough weakness tinnitus palpitations\par Denies any gait disturbance nausea vomiting\par Denies any blurry vision dysarthria

## 2021-09-18 NOTE — ASSESSMENT
[FreeTextEntry1] : Patient symptoms consistent with benign vertigo\par Meclizine given\par If persistent worsening to see vestibular rehab currently deferred states overall improving\par Did have endoscopy took antibiotics H. pylori resolved by endoscopy

## 2022-02-23 ENCOUNTER — APPOINTMENT (OUTPATIENT)
Dept: INTERNAL MEDICINE | Facility: CLINIC | Age: 77
End: 2022-02-23
Payer: MEDICARE

## 2022-02-23 ENCOUNTER — LABORATORY RESULT (OUTPATIENT)
Age: 77
End: 2022-02-23

## 2022-02-23 VITALS
SYSTOLIC BLOOD PRESSURE: 145 MMHG | DIASTOLIC BLOOD PRESSURE: 77 MMHG | WEIGHT: 220 LBS | BODY MASS INDEX: 43.19 KG/M2 | TEMPERATURE: 97.1 F | OXYGEN SATURATION: 97 % | HEART RATE: 62 BPM | HEIGHT: 59.84 IN

## 2022-02-23 DIAGNOSIS — H81.10 BENIGN PAROXYSMAL VERTIGO, UNSPECIFIED EAR: ICD-10-CM

## 2022-02-23 DIAGNOSIS — I10 ESSENTIAL (PRIMARY) HYPERTENSION: ICD-10-CM

## 2022-02-23 PROCEDURE — G0439: CPT

## 2022-02-23 PROCEDURE — 36415 COLL VENOUS BLD VENIPUNCTURE: CPT

## 2022-02-23 RX ORDER — PANTOPRAZOLE 20 MG/1
20 TABLET, DELAYED RELEASE ORAL TWICE DAILY
Qty: 30 | Refills: 0 | Status: DISCONTINUED | COMMUNITY
Start: 2021-04-26 | End: 2022-02-23

## 2022-02-23 RX ORDER — CLARITHROMYCIN 500 MG/1
500 TABLET, FILM COATED ORAL TWICE DAILY
Qty: 28 | Refills: 0 | Status: DISCONTINUED | COMMUNITY
Start: 2021-04-26 | End: 2022-02-23

## 2022-02-23 RX ORDER — AMOXICILLIN 500 MG/1
500 TABLET, FILM COATED ORAL TWICE DAILY
Qty: 56 | Refills: 0 | Status: DISCONTINUED | COMMUNITY
Start: 2021-04-26 | End: 2022-02-23

## 2022-02-23 NOTE — HISTORY OF PRESENT ILLNESS
[FreeTextEntry1] : Presents for annual wellness visit. [de-identified] : Patient is doing well overall. She Dizziness has improved\par Denies any CP, SOB, chest tightness, or LE edema. \par UTD with DEXA and Mammogram

## 2022-02-23 NOTE — REASON FOR VISIT
[Annual Wellness Visit] : an annual wellness visit [FreeTextEntry1] : subsequent medicare wellness visit

## 2022-02-23 NOTE — ASSESSMENT
[FreeTextEntry1] : Annual Wellness Visit\par -Will check routine labs and blood work.\par -Checks blood pressure at home at goal.\par -Advise lifestyle modification\par -Continue exercises for vertigo

## 2022-02-23 NOTE — ADDENDUM
[FreeTextEntry1] : I, Rashad Camp, acted solely as a scribe for Dr. Rohit Warren M.D. on this date 02/23/2022  .\par  \par All medical record entries made by the Scribe were at my, Dr. Rohit Warren M.D., direction and personally dictated by me on 02/23/2022 . I have reviewed the chart and agree that the record accurately reflects my personal performance of the history, physical exam, assessment and plan. I have also personally directed, reviewed, and agreed with the chart.

## 2022-02-27 LAB
25(OH)D3 SERPL-MCNC: 22.1 NG/ML
ALBUMIN SERPL ELPH-MCNC: 4.7 G/DL
ALP BLD-CCNC: 103 U/L
ALT SERPL-CCNC: 20 U/L
ANION GAP SERPL CALC-SCNC: 12 MMOL/L
APPEARANCE: CLEAR
APPEARANCE: CLEAR
AST SERPL-CCNC: 19 U/L
BACTERIA: ABNORMAL
BASOPHILS # BLD AUTO: 0.05 K/UL
BASOPHILS NFR BLD AUTO: 0.7 %
BILIRUB SERPL-MCNC: 0.4 MG/DL
BILIRUBIN URINE: NEGATIVE
BILIRUBIN URINE: NEGATIVE
BLOOD URINE: NORMAL
BLOOD URINE: NORMAL
BUN SERPL-MCNC: 29 MG/DL
CALCIUM SERPL-MCNC: 10.6 MG/DL
CHLORIDE SERPL-SCNC: 101 MMOL/L
CHOLEST SERPL-MCNC: 150 MG/DL
CO2 SERPL-SCNC: 27 MMOL/L
COLOR: NORMAL
COLOR: NORMAL
CREAT SERPL-MCNC: 0.97 MG/DL
EOSINOPHIL # BLD AUTO: 0.16 K/UL
EOSINOPHIL NFR BLD AUTO: 2.1 %
ESTIMATED AVERAGE GLUCOSE: 128 MG/DL
GLUCOSE QUALITATIVE U: NEGATIVE
GLUCOSE QUALITATIVE U: NEGATIVE
GLUCOSE SERPL-MCNC: 95 MG/DL
HBA1C MFR BLD HPLC: 6.1 %
HCT VFR BLD CALC: 46.6 %
HDLC SERPL-MCNC: 41 MG/DL
HGB BLD-MCNC: 14.6 G/DL
HYALINE CASTS: 1 /LPF
IMM GRANULOCYTES NFR BLD AUTO: 0.1 %
KETONES URINE: NEGATIVE
KETONES URINE: NEGATIVE
LDLC SERPL CALC-MCNC: 70 MG/DL
LEUKOCYTE ESTERASE URINE: ABNORMAL
LEUKOCYTE ESTERASE URINE: ABNORMAL
LYMPHOCYTES # BLD AUTO: 1.63 K/UL
LYMPHOCYTES NFR BLD AUTO: 21.7 %
MAN DIFF?: NORMAL
MCHC RBC-ENTMCNC: 28.3 PG
MCHC RBC-ENTMCNC: 31.3 GM/DL
MCV RBC AUTO: 90.5 FL
MICROSCOPIC-UA: NORMAL
MONOCYTES # BLD AUTO: 0.68 K/UL
MONOCYTES NFR BLD AUTO: 9.1 %
NEUTROPHILS # BLD AUTO: 4.97 K/UL
NEUTROPHILS NFR BLD AUTO: 66.3 %
NITRITE URINE: POSITIVE
NITRITE URINE: POSITIVE
NONHDLC SERPL-MCNC: 109 MG/DL
PH URINE: 6
PH URINE: 6
PLATELET # BLD AUTO: 204 K/UL
POTASSIUM SERPL-SCNC: 4.8 MMOL/L
PROT SERPL-MCNC: 7.5 G/DL
PROTEIN URINE: NEGATIVE
PROTEIN URINE: NEGATIVE
RBC # BLD: 5.15 M/UL
RBC # FLD: 14.8 %
RED BLOOD CELLS URINE: 4 /HPF
SODIUM SERPL-SCNC: 140 MMOL/L
SPECIFIC GRAVITY URINE: 1.02
SPECIFIC GRAVITY URINE: 1.02
SQUAMOUS EPITHELIAL CELLS: 3 /HPF
TRIGL SERPL-MCNC: 197 MG/DL
TSH SERPL-ACNC: 3.46 UIU/ML
UROBILINOGEN URINE: NORMAL
UROBILINOGEN URINE: NORMAL
VIT B12 SERPL-MCNC: 470 PG/ML
WBC # FLD AUTO: 7.5 K/UL
WHITE BLOOD CELLS URINE: 16 /HPF

## 2022-06-28 ENCOUNTER — APPOINTMENT (OUTPATIENT)
Dept: INTERNAL MEDICINE | Facility: CLINIC | Age: 77
End: 2022-06-28
Payer: MEDICARE

## 2022-06-28 VITALS
WEIGHT: 232 LBS | SYSTOLIC BLOOD PRESSURE: 144 MMHG | HEART RATE: 67 BPM | OXYGEN SATURATION: 95 % | TEMPERATURE: 96.4 F | DIASTOLIC BLOOD PRESSURE: 70 MMHG | HEIGHT: 59.84 IN | BODY MASS INDEX: 45.55 KG/M2

## 2022-06-28 DIAGNOSIS — E83.52 HYPERCALCEMIA: ICD-10-CM

## 2022-06-28 DIAGNOSIS — G44.86 CERVICOGENIC HEADACHE: ICD-10-CM

## 2022-06-28 DIAGNOSIS — R05.3 CHRONIC COUGH: ICD-10-CM

## 2022-06-28 PROCEDURE — 99214 OFFICE O/P EST MOD 30 MIN: CPT | Mod: 25

## 2022-06-28 PROCEDURE — 36415 COLL VENOUS BLD VENIPUNCTURE: CPT

## 2022-06-28 NOTE — ADDENDUM
[FreeTextEntry1] : I, Polina Andino, acted as a scribe on behalf of Dr. Rohit Warren MD, on 06/28/2022. \par \par All medical entries made by the scribe were at my, Dr. Rohit Warren MD, direction and personally dictated by me on 06/28/2022. I have reviewed the chart and agree that the record accurately reflects my personal performance of the history, physical exam, assessment and plan. I have also personally directed, reviewed, and agreed with the chart.

## 2022-06-28 NOTE — ASSESSMENT
[FreeTextEntry1] : -Blood work done for hypercalcemia.\par -Suspect dizziness likely cervicogenic. Pt has no other neurologic symptoms. Cervical XR Ordered\par Neurologic exam WNL. Consider Physical Therapy.\par -Pt has had hearing test in the past year.\par -Cough is likely post viral. CXR ordered given. Symptom has been about 4 weeks.

## 2022-06-28 NOTE — HISTORY OF PRESENT ILLNESS
[FreeTextEntry1] : Patient presents for follow-up for hypercalcemia. [de-identified] : Patient c/o heaviness on the back of the neck, neck pain and stiffness. Denies any radicular symptoms.\par Does have Hx of vertigo. Denies any associated gait stability, weakness numbness or double vision.\par Pt saw ENT and had a hearing test in the past year. Was advised to do Epley maneuver.\par Also c/o cold which started about 4 weeks ago.\par Continues to have cough however improving.

## 2022-07-02 LAB
CA-I SERPL-SCNC: 5 MG/DL
CALCIUM SERPL-MCNC: 9.7 MG/DL
DEPRECATED KAPPA LC FREE/LAMBDA SER: 1.36 RATIO
IGA SER QL IEP: 233 MG/DL
IGG SER QL IEP: 1119 MG/DL
IGM SER QL IEP: 46 MG/DL
KAPPA LC CSF-MCNC: 1.7 MG/DL
KAPPA LC SERPL-MCNC: 2.32 MG/DL
PARATHYROID HORMONE INTACT: 51 PG/ML

## 2022-07-03 LAB
ALBUPE: 31.1 %
ALPHA1UPE: 30.8 %
ALPHA2UPE: 16.7 %
BETAUPE: 10.1 %
CREAT 24H UR-MCNC: NORMAL G/24 H
CREATININE UR (MAYO): 80 MG/DL
GAMMAUPE: 11.3 %
IGA 24H UR QL IFE: NORMAL
KAPPA LC 24H UR QL: NORMAL
PROT PATTERN 24H UR ELPH-IMP: NORMAL
PROT UR-MCNC: 16 MG/DL
PROT UR-MCNC: 16 MG/DL
SPECIMEN VOL 24H UR: NORMAL ML

## 2022-07-05 LAB
ALBUMIN MFR SERPL ELPH: 57.4 %
ALBUMIN SERPL-MCNC: 4.2 G/DL
ALBUMIN/GLOB SERPL: 1.3 RATIO
ALPHA1 GLOB MFR SERPL ELPH: 4.3 %
ALPHA1 GLOB SERPL ELPH-MCNC: 0.3 G/DL
ALPHA2 GLOB MFR SERPL ELPH: 10.1 %
ALPHA2 GLOB SERPL ELPH-MCNC: 0.7 G/DL
B-GLOBULIN MFR SERPL ELPH: 12.3 %
B-GLOBULIN SERPL ELPH-MCNC: 0.9 G/DL
GAMMA GLOB FLD ELPH-MCNC: 1.2 G/DL
GAMMA GLOB MFR SERPL ELPH: 15.9 %
INTERPRETATION SERPL IEP-IMP: NORMAL
M PROTEIN SPEC IFE-MCNC: NORMAL
PROT SERPL-MCNC: 7.4 G/DL
PROT SERPL-MCNC: 7.4 G/DL

## 2022-07-08 LAB — PTH RELATED PROT SERPL-MCNC: <2 PMOL/L

## 2022-07-11 ENCOUNTER — NON-APPOINTMENT (OUTPATIENT)
Age: 77
End: 2022-07-11

## 2022-10-18 ENCOUNTER — APPOINTMENT (OUTPATIENT)
Dept: INTERNAL MEDICINE | Facility: CLINIC | Age: 77
End: 2022-10-18

## 2022-10-23 ENCOUNTER — NON-APPOINTMENT (OUTPATIENT)
Age: 77
End: 2022-10-23

## 2022-10-23 DIAGNOSIS — N28.1 CYST OF KIDNEY, ACQUIRED: ICD-10-CM

## 2022-11-09 ENCOUNTER — NON-APPOINTMENT (OUTPATIENT)
Age: 77
End: 2022-11-09

## 2022-12-27 ENCOUNTER — INPATIENT (INPATIENT)
Facility: HOSPITAL | Age: 77
LOS: 1 days | Discharge: HOME | DRG: 274 | End: 2022-12-29
Attending: HOSPITALIST | Admitting: INTERNAL MEDICINE
Payer: MEDICARE

## 2022-12-27 VITALS
DIASTOLIC BLOOD PRESSURE: 98 MMHG | WEIGHT: 220.02 LBS | HEIGHT: 62 IN | RESPIRATION RATE: 16 BRPM | TEMPERATURE: 98 F | OXYGEN SATURATION: 96 % | SYSTOLIC BLOOD PRESSURE: 185 MMHG | HEART RATE: 71 BPM

## 2022-12-27 DIAGNOSIS — E78.5 HYPERLIPIDEMIA, UNSPECIFIED: ICD-10-CM

## 2022-12-27 DIAGNOSIS — Z96.653 PRESENCE OF ARTIFICIAL KNEE JOINT, BILATERAL: Chronic | ICD-10-CM

## 2022-12-27 DIAGNOSIS — I48.91 UNSPECIFIED ATRIAL FIBRILLATION: ICD-10-CM

## 2022-12-27 DIAGNOSIS — I10 ESSENTIAL (PRIMARY) HYPERTENSION: ICD-10-CM

## 2022-12-27 LAB
HCT VFR BLD CALC: 46 % — HIGH (ref 34.5–45)
HGB BLD-MCNC: 15 G/DL — SIGNIFICANT CHANGE UP (ref 11.5–15.5)
MCHC RBC-ENTMCNC: 28.3 PG — SIGNIFICANT CHANGE UP (ref 27–34)
MCHC RBC-ENTMCNC: 32.6 GM/DL — SIGNIFICANT CHANGE UP (ref 32–36)
MCV RBC AUTO: 86.8 FL — SIGNIFICANT CHANGE UP (ref 80–100)
NRBC # BLD: 0 /100 WBCS — SIGNIFICANT CHANGE UP (ref 0–0)
PLATELET # BLD AUTO: 229 K/UL — SIGNIFICANT CHANGE UP (ref 150–400)
RBC # BLD: 5.3 M/UL — HIGH (ref 3.8–5.2)
RBC # FLD: 14.1 % — SIGNIFICANT CHANGE UP (ref 10.3–14.5)
SARS-COV-2 RNA SPEC QL NAA+PROBE: NEGATIVE — SIGNIFICANT CHANGE UP
WBC # BLD: 10.41 K/UL — SIGNIFICANT CHANGE UP (ref 3.8–10.5)
WBC # FLD AUTO: 10.41 K/UL — SIGNIFICANT CHANGE UP (ref 3.8–10.5)

## 2022-12-27 PROCEDURE — 71045 X-RAY EXAM CHEST 1 VIEW: CPT | Mod: 26

## 2022-12-27 PROCEDURE — 99223 1ST HOSP IP/OBS HIGH 75: CPT

## 2022-12-27 PROCEDURE — 99285 EMERGENCY DEPT VISIT HI MDM: CPT

## 2022-12-27 NOTE — H&P ADULT - HISTORY OF PRESENT ILLNESS
77 y/o female w/ PMHx of HTN, HLD, vertigo who presents to ED w/ c/o palpitations. Pt notes she experienced an episode of palpitations lasting approx 10 minutes earlier today. No CP, SOB, or dizziness throughout episode.  Pt was recently admitted at [facility] last week for palpitations where was found to be in new onset afib, pt was started on metoprolol and eliquis and discharged home.  Pt was seen yesterday by cardiologist who increased metoprolol dose.     In ER, VS stable. ENSR, HR 70s. /80s. on RA.     Pt admitted to cardiology for ablation tomorrow.

## 2022-12-27 NOTE — ED ADULT TRIAGE NOTE - CHIEF COMPLAINT QUOTE
Pt presents to the ED c/o palpitations that began this afternoon. Pt recently dc'd from OSH for a-fib. Denies any other sx

## 2022-12-27 NOTE — ED ADULT NURSE NOTE - OBJECTIVE STATEMENT
pt presents to the ED for palpitations. Pt states last Friday she started to feel palpitations and was taken to [facility] where they kept her for 2 days and gave additional doses of metoprolol and was dx home w/ a prescription of eliquis 5mg 2x a day and metoprolol 25mg 2x a day. pt states she felt fine after being dx but tonight after taking her medication @ 6PM, she went to lay down and started to feel her heart racing. at this time pt denies any palpitations or discomfort, denies any SOB/lightheadedness/dizziness. pt is sitting comfortably on stretcher and does not appear in distress

## 2022-12-27 NOTE — ED PROVIDER NOTE - CLINICAL SUMMARY MEDICAL DECISION MAKING FREE TEXT BOX
hr 70, EKG w/ lbbb not signifncatly changed from ladt ekg reviewable in 2018. will eval for ischemic possibility, though unlikely with troponin. electrolyte evaluation. d/w patients cardiologist dr dean, reccomending admission for AM ablation.

## 2022-12-27 NOTE — H&P ADULT - NSHPPHYSICALEXAM_GEN_ALL_CORE
Appearance: Normal	  HEENT:   Normal oral mucosa, PERRL, EOMI	  Neck: Supple,  - JVD;- Carotid Bruit   Cardiovascular: Normal S1 S2, No JVD, No murmurs,   Respiratory: Lungs clear to auscultation/Decreased Breath Sounds/No Rales, Rhonchi, Wheezing	  Gastrointestinal:  Soft, Non-tender, + BS	  Skin: No rashes, No ecchymoses, No cyanosis  Extremities: Normal range of motion, No clubbing, cyanosis or edema  Vascular: Peripheral pulses palpable 2+ bilaterally  Neurologic: Non-focal  Psychiatry: A & O x 3, Mood & affect appropriate

## 2022-12-27 NOTE — H&P ADULT - PROBLEM SELECTOR PLAN 1
-Pt w/ recent diagnosis of Afib last week while admitted to [facility], started on bbl w/ subsequent uptitration for persistent c/o palpitations.   -Pt again today w/ 10 minute episode of palpitations, now admitted for ablation in AM. NPO @ MDN.   -Continue Metoprolol , uptitrate as needed. -Pt w/ recent diagnosis of Afib last week while admitted to [facility], started on bbl w/ subsequent uptitration for persistent c/o palpitations.   -Pt again today w/ 10 minute episode of palpitations, now admitted for ablation in AM. NPO @ MDN.   -Continue Metoprolol 50 BID, uptitrate as needed.  -Continue Eliquis 5 mg BID.

## 2022-12-27 NOTE — H&P ADULT - ASSESSMENT
76 y/o female w/ PMHx of HTN,HLD, recently dx w/ new onset afib, w/ recurrent palpitations despite uptitrate bbl, now admitted for ablation tomorrow.

## 2022-12-27 NOTE — ED PROVIDER NOTE - PHYSICAL EXAMINATION
General: Awake, alert and oriented. No acute distress. Well developed, hydrated and nourished. Appears stated age.  Skin: Skin in warm, dry and intact without rashes or lesions. Appropriate color for ethnicity  HENMT: head normocephalic and atraumatic; bilateral external ears without swelling. no nasal discharge. moist oral mucosa. supple neck, trachea midline  EYES: Conjunctiva clear. nonicteric sclera. EOM intact, Eyelids are normal in appearance without swelling or lesions.  Cardiac: well perfused, s1, s2, rrr  Respiratory: breathing comfortably on room air. no audible wheezing or stridor, lungs ctab, no chest wall tenderness to palpation  Abdominal: nondistended  MSK: Neck and back are without deformity, visible external skin changes, or signs of trauma. Curvature of the cervical, thoracic, and lumbar spine are within normal limits. no external signs of trauma. no apparent deficits in ROM of any extremity. no leg swelling or tenderness  Neurological: The patient is awake, alert and oriented to person, place, and time with normal speech. CN 2-12 grossly intact. no apparent deficits. Memory is normal and thought process is intact.  Psychiatric: Appropriate mood and affect. Good judgement and insight.

## 2022-12-27 NOTE — ED PROVIDER NOTE - OBJECTIVE STATEMENT
77f presenting for palpitations an hypertension. had ~10 minutes of palpitations earleir tonight. no chest pain or sob at that time. took her BP during this period and was hypertensive. was admitted to [facility] ~1 week ago for palpitations, found to be in new onset afib, had her BP meds changed to metoprolol and was started on eliquis. went to [facility] yesterday for palpitations, had her metoprolol dose increased at that time. otherwise in usual state of health.

## 2022-12-28 LAB
A1C WITH ESTIMATED AVERAGE GLUCOSE RESULT: 5.8 % — HIGH (ref 4–5.6)
ALBUMIN SERPL ELPH-MCNC: 3.9 G/DL — SIGNIFICANT CHANGE UP (ref 3.3–5)
ALBUMIN SERPL ELPH-MCNC: 4.5 G/DL — SIGNIFICANT CHANGE UP (ref 3.3–5)
ALP SERPL-CCNC: 82 U/L — SIGNIFICANT CHANGE UP (ref 40–120)
ALP SERPL-CCNC: 86 U/L — SIGNIFICANT CHANGE UP (ref 40–120)
ALT FLD-CCNC: 19 U/L — SIGNIFICANT CHANGE UP (ref 10–45)
ALT FLD-CCNC: 22 U/L — SIGNIFICANT CHANGE UP (ref 10–45)
ANION GAP SERPL CALC-SCNC: 9 MMOL/L — SIGNIFICANT CHANGE UP (ref 5–17)
ANION GAP SERPL CALC-SCNC: 9 MMOL/L — SIGNIFICANT CHANGE UP (ref 5–17)
APTT BLD: 40.5 SEC — HIGH (ref 27.5–35.5)
AST SERPL-CCNC: 18 U/L — SIGNIFICANT CHANGE UP (ref 10–40)
AST SERPL-CCNC: 21 U/L — SIGNIFICANT CHANGE UP (ref 10–40)
BILIRUB SERPL-MCNC: 0.6 MG/DL — SIGNIFICANT CHANGE UP (ref 0.2–1.2)
BILIRUB SERPL-MCNC: 0.7 MG/DL — SIGNIFICANT CHANGE UP (ref 0.2–1.2)
BUN SERPL-MCNC: 13 MG/DL — SIGNIFICANT CHANGE UP (ref 7–23)
BUN SERPL-MCNC: 19 MG/DL — SIGNIFICANT CHANGE UP (ref 7–23)
CALCIUM SERPL-MCNC: 9.5 MG/DL — SIGNIFICANT CHANGE UP (ref 8.4–10.5)
CALCIUM SERPL-MCNC: 9.7 MG/DL — SIGNIFICANT CHANGE UP (ref 8.4–10.5)
CHLORIDE SERPL-SCNC: 100 MMOL/L — SIGNIFICANT CHANGE UP (ref 96–108)
CHLORIDE SERPL-SCNC: 101 MMOL/L — SIGNIFICANT CHANGE UP (ref 96–108)
CHOLEST SERPL-MCNC: 137 MG/DL — SIGNIFICANT CHANGE UP
CO2 SERPL-SCNC: 29 MMOL/L — SIGNIFICANT CHANGE UP (ref 22–31)
CO2 SERPL-SCNC: 31 MMOL/L — SIGNIFICANT CHANGE UP (ref 22–31)
CREAT SERPL-MCNC: 0.81 MG/DL — SIGNIFICANT CHANGE UP (ref 0.5–1.3)
CREAT SERPL-MCNC: 0.96 MG/DL — SIGNIFICANT CHANGE UP (ref 0.5–1.3)
EGFR: 62 ML/MIN/1.73M2 — SIGNIFICANT CHANGE UP
EGFR: 76 ML/MIN/1.73M2 — SIGNIFICANT CHANGE UP
ESTIMATED AVERAGE GLUCOSE: 120 MG/DL — HIGH (ref 68–114)
GLUCOSE SERPL-MCNC: 100 MG/DL — HIGH (ref 70–99)
GLUCOSE SERPL-MCNC: 107 MG/DL — HIGH (ref 70–99)
HCT VFR BLD CALC: 42.8 % — SIGNIFICANT CHANGE UP (ref 34.5–45)
HCV AB S/CO SERPL IA: 0.04 S/CO — SIGNIFICANT CHANGE UP
HCV AB SERPL-IMP: SIGNIFICANT CHANGE UP
HDLC SERPL-MCNC: 32 MG/DL — LOW
HGB BLD-MCNC: 13.9 G/DL — SIGNIFICANT CHANGE UP (ref 11.5–15.5)
INR BLD: 1.32 — HIGH (ref 0.88–1.16)
LIPID PNL WITH DIRECT LDL SERPL: 78 MG/DL — SIGNIFICANT CHANGE UP
MCHC RBC-ENTMCNC: 28.1 PG — SIGNIFICANT CHANGE UP (ref 27–34)
MCHC RBC-ENTMCNC: 32.5 GM/DL — SIGNIFICANT CHANGE UP (ref 32–36)
MCV RBC AUTO: 86.6 FL — SIGNIFICANT CHANGE UP (ref 80–100)
NON HDL CHOLESTEROL: 105 MG/DL — SIGNIFICANT CHANGE UP
NRBC # BLD: 0 /100 WBCS — SIGNIFICANT CHANGE UP (ref 0–0)
NT-PROBNP SERPL-SCNC: 424 PG/ML — HIGH (ref 0–300)
PLATELET # BLD AUTO: 210 K/UL — SIGNIFICANT CHANGE UP (ref 150–400)
POTASSIUM SERPL-MCNC: 4 MMOL/L — SIGNIFICANT CHANGE UP (ref 3.5–5.3)
POTASSIUM SERPL-MCNC: 4.9 MMOL/L — SIGNIFICANT CHANGE UP (ref 3.5–5.3)
POTASSIUM SERPL-SCNC: 4 MMOL/L — SIGNIFICANT CHANGE UP (ref 3.5–5.3)
POTASSIUM SERPL-SCNC: 4.9 MMOL/L — SIGNIFICANT CHANGE UP (ref 3.5–5.3)
PROT SERPL-MCNC: 7 G/DL — SIGNIFICANT CHANGE UP (ref 6–8.3)
PROT SERPL-MCNC: 7.7 G/DL — SIGNIFICANT CHANGE UP (ref 6–8.3)
PROTHROM AB SERPL-ACNC: 15.7 SEC — HIGH (ref 10.5–13.4)
RBC # BLD: 4.94 M/UL — SIGNIFICANT CHANGE UP (ref 3.8–5.2)
RBC # FLD: 14 % — SIGNIFICANT CHANGE UP (ref 10.3–14.5)
SODIUM SERPL-SCNC: 139 MMOL/L — SIGNIFICANT CHANGE UP (ref 135–145)
SODIUM SERPL-SCNC: 140 MMOL/L — SIGNIFICANT CHANGE UP (ref 135–145)
T4 AB SER-ACNC: 7.69 UG/DL — SIGNIFICANT CHANGE UP (ref 4.5–11.7)
TRIGL SERPL-MCNC: 134 MG/DL — SIGNIFICANT CHANGE UP
TROPONIN T SERPL-MCNC: 0.01 NG/ML — SIGNIFICANT CHANGE UP (ref 0–0.01)
TSH SERPL-MCNC: 4.51 UIU/ML — HIGH (ref 0.27–4.2)
WBC # BLD: 7.1 K/UL — SIGNIFICANT CHANGE UP (ref 3.8–10.5)
WBC # FLD AUTO: 7.1 K/UL — SIGNIFICANT CHANGE UP (ref 3.8–10.5)

## 2022-12-28 PROCEDURE — 93656 COMPRE EP EVAL ABLTJ ATR FIB: CPT

## 2022-12-28 PROCEDURE — 99233 SBSQ HOSP IP/OBS HIGH 50: CPT

## 2022-12-28 PROCEDURE — 75574 CT ANGIO HRT W/3D IMAGE: CPT | Mod: 26

## 2022-12-28 RX ORDER — APIXABAN 2.5 MG/1
1 TABLET, FILM COATED ORAL
Qty: 0 | Refills: 0 | DISCHARGE

## 2022-12-28 RX ORDER — APIXABAN 2.5 MG/1
5 TABLET, FILM COATED ORAL EVERY 12 HOURS
Refills: 0 | Status: DISCONTINUED | OUTPATIENT
Start: 2022-12-28 | End: 2022-12-29

## 2022-12-28 RX ORDER — OLMESARTAN MEDOXOMIL 5 MG/1
1 TABLET, FILM COATED ORAL
Qty: 0 | Refills: 0 | DISCHARGE

## 2022-12-28 RX ORDER — FUROSEMIDE 40 MG
20 TABLET ORAL ONCE
Refills: 0 | Status: COMPLETED | OUTPATIENT
Start: 2022-12-28 | End: 2022-12-28

## 2022-12-28 RX ORDER — ACETAMINOPHEN 500 MG
650 TABLET ORAL EVERY 6 HOURS
Refills: 0 | Status: DISCONTINUED | OUTPATIENT
Start: 2022-12-28 | End: 2022-12-29

## 2022-12-28 RX ORDER — METOPROLOL TARTRATE 50 MG
50 TABLET ORAL DAILY
Refills: 0 | Status: DISCONTINUED | OUTPATIENT
Start: 2022-12-29 | End: 2022-12-29

## 2022-12-28 RX ORDER — SODIUM CHLORIDE 9 MG/ML
1000 INJECTION, SOLUTION INTRAVENOUS
Refills: 0 | Status: DISCONTINUED | OUTPATIENT
Start: 2022-12-28 | End: 2022-12-29

## 2022-12-28 RX ORDER — PROPRANOLOL HCL 160 MG
1 CAPSULE, EXTENDED RELEASE 24HR ORAL
Qty: 0 | Refills: 0 | DISCHARGE

## 2022-12-28 RX ORDER — APIXABAN 2.5 MG/1
5 TABLET, FILM COATED ORAL EVERY 12 HOURS
Refills: 0 | Status: DISCONTINUED | OUTPATIENT
Start: 2022-12-28 | End: 2022-12-28

## 2022-12-28 RX ORDER — ATORVASTATIN CALCIUM 80 MG/1
20 TABLET, FILM COATED ORAL AT BEDTIME
Refills: 0 | Status: DISCONTINUED | OUTPATIENT
Start: 2022-12-28 | End: 2022-12-29

## 2022-12-28 RX ORDER — METOPROLOL TARTRATE 50 MG
50 TABLET ORAL
Refills: 0 | Status: DISCONTINUED | OUTPATIENT
Start: 2022-12-28 | End: 2022-12-28

## 2022-12-28 RX ADMIN — Medication 650 MILLIGRAM(S): at 12:40

## 2022-12-28 RX ADMIN — APIXABAN 5 MILLIGRAM(S): 2.5 TABLET, FILM COATED ORAL at 22:14

## 2022-12-28 RX ADMIN — Medication 20 MILLIGRAM(S): at 19:48

## 2022-12-28 RX ADMIN — ATORVASTATIN CALCIUM 20 MILLIGRAM(S): 80 TABLET, FILM COATED ORAL at 21:30

## 2022-12-28 RX ADMIN — Medication 650 MILLIGRAM(S): at 13:39

## 2022-12-28 RX ADMIN — Medication 50 MILLIGRAM(S): at 07:04

## 2022-12-28 NOTE — CHART NOTE - NSCHARTNOTEFT_GEN_A_CORE
VIDHI STEPHENSON  [id number]    PROCEDURE:  Afib ablation    INDICATION:  Paroxysmal afib    ELECTROPHYSIOLOGIST(S):  MD Arun Hannah MD (fellow)    ANESTHESIOLOGY:  GA    FINDINGS:  Successful RF PVI    COMPLICATIONS:  None    RECOMMENDATIONS:  Bedrest 3 hours  Resume eliquis tonight at 10pm  Start metoprolol succinate 100mg daily  Protonix 40mg daily x1 month

## 2022-12-28 NOTE — ED ADULT NURSE REASSESSMENT NOTE - NS ED NURSE REASSESS COMMENT FT1
covering primary RN, transport arrived to ED as auto-dispatch for transport to [facility]. at time of transport arrival, writer attempted to give report to floor but floor requesting writer give them 10 minutes. writer informed RN Dora that because it is an auto-dispatch per policy if unable to give telephone report the expectation is for bedside report to be endorsed. floor RN requesting delay due to concern for patient safety. writer and [facility] RN agreed to 2nd call in 10 minutes.

## 2022-12-28 NOTE — PROGRESS NOTE ADULT - SUBJECTIVE AND OBJECTIVE BOX
VIDHI STEPHENSON, 77y, Female  MRN-[id number]  Patient is a 77y old  Female who presents with a chief complaint of Palpitations (28 Dec 2022 09:04)      OVERNIGHT EVENTS: KESHIA    SUBJECTIVE: Assessed at bedside, denies any new complaints.     12 Point ROS Negative unless noted otherwise above.  -------------------------------------------------------------------------------  VITAL SIGNS:  Vital Signs Last 24 Hrs  T(C): 37.1 (28 Dec 2022 13:34), Max: 37.1 (28 Dec 2022 09:24)  T(F): 98.7 (28 Dec 2022 13:34), Max: 98.8 (28 Dec 2022 09:24)  HR: 63 (28 Dec 2022 13:11) (58 - 78)  BP: 108/58 (28 Dec 2022 13:11) (108/58 - 185/98)  BP(mean): 79 (28 Dec 2022 13:11) (79 - 95)  RR: 20 (28 Dec 2022 13:11) (16 - 20)  SpO2: 94% (28 Dec 2022 13:11) (92% - 97%)    Parameters below as of 28 Dec 2022 12:49  Patient On (Oxygen Delivery Method): room air      I&O's Summary      PHYSICAL EXAM:    General: NAD, well developed  HEENT: NC/AT; EOMI, PERRLA, anicteric sclera; moist mucosal membranes.  Neck: supple, trachea midline  Cardiovascular: RRR, +S1/S2; NO M/R/G  Respiratory: CTA B/L; no W/R/R  Gastrointestinal: soft, NT/ND; +BSx4  Extremities: WWP; no edema or cyanosis  Vascular: 2+ radial, DP/PT pulses B/L  Neurological: AAOx3; no focal deficits    ALLERGIES:  Allergies    No Known Allergies    Intolerances        MEDICATIONS:  MEDICATIONS  (STANDING):  apixaban 5 milliGRAM(s) Oral every 12 hours  atorvastatin 20 milliGRAM(s) Oral at bedtime    MEDICATIONS  (PRN):  acetaminophen     Tablet .. 650 milliGRAM(s) Oral every 6 hours PRN Temp greater or equal to 38C (100.4F), Mild Pain (1 - 3)      -------------------------------------------------------------------------------  LABS:                        13.9   7.10  )-----------( 210      ( 28 Dec 2022 08:05 )             42.8     12-28    139  |  101  |  13  ----------------------------<  107<H>  4.0   |  29  |  0.81    Ca    9.5      28 Dec 2022 08:05    TPro  7.0  /  Alb  3.9  /  TBili  0.7  /  DBili  x   /  AST  18  /  ALT  19  /  AlkPhos  82  12-28    LIVER FUNCTIONS - ( 28 Dec 2022 08:05 )  Alb: 3.9 g/dL / Pro: 7.0 g/dL / ALK PHOS: 82 U/L / ALT: 19 U/L / AST: 18 U/L / GGT: x           PT/INR - ( 28 Dec 2022 08:05 )   PT: 15.7 sec;   INR: 1.32          PTT - ( 28 Dec 2022 08:05 )  PTT:40.5 sec    CAPILLARY BLOOD GLUCOSE          COVID-19 PCR: Negative (27 Dec 2022 23:34)      RADIOLOGY & ADDITIONAL TESTS: Reviewed.       VIDHI STEPHENSON, 77y, Female  MRN-[id number]  Patient is a 77y old  Female who presents with a chief complaint of Palpitations (28 Dec 2022 09:04)      OVERNIGHT EVENTS: KESHIA    SUBJECTIVE: Assessed at bedside, denies any new complaints. Patient denies fever/chills, nausea, vomiting, headache, chest pain, palpitations, dyspnea, cough, changes in appetite, constipation, diarrhea, abdominal pain, rashes/sores, dizziness, or fainting/LOC     12 Point ROS Negative unless noted otherwise above.  -------------------------------------------------------------------------------  VITAL SIGNS:  Vital Signs Last 24 Hrs  T(C): 37.1 (28 Dec 2022 13:34), Max: 37.1 (28 Dec 2022 09:24)  T(F): 98.7 (28 Dec 2022 13:34), Max: 98.8 (28 Dec 2022 09:24)  HR: 63 (28 Dec 2022 13:11) (58 - 78)  BP: 108/58 (28 Dec 2022 13:11) (108/58 - 185/98)  BP(mean): 79 (28 Dec 2022 13:11) (79 - 95)  RR: 20 (28 Dec 2022 13:11) (16 - 20)  SpO2: 94% (28 Dec 2022 13:11) (92% - 97%)    Parameters below as of 28 Dec 2022 12:49  Patient On (Oxygen Delivery Method): room air      I&O's Summary      PHYSICAL EXAM:    General: NAD, well developed  HEENT: NC/AT; EOMI, PERRLA, anicteric sclera; moist mucosal membranes.  Neck: supple, trachea midline  Cardiovascular: RRR, +S1/S2; NO M/R/G  Respiratory: CTA B/L; no W/R/R  Gastrointestinal: soft, NT/ND; +BSx4  Extremities: WWP; no edema or cyanosis  Vascular: 2+ radial, DP/PT pulses B/L  Neurological: AAOx3; no focal deficits    ALLERGIES:  Allergies    No Known Allergies    Intolerances        MEDICATIONS:  MEDICATIONS  (STANDING):  apixaban 5 milliGRAM(s) Oral every 12 hours  atorvastatin 20 milliGRAM(s) Oral at bedtime    MEDICATIONS  (PRN):  acetaminophen     Tablet .. 650 milliGRAM(s) Oral every 6 hours PRN Temp greater or equal to 38C (100.4F), Mild Pain (1 - 3)      -------------------------------------------------------------------------------  LABS:                        13.9   7.10  )-----------( 210      ( 28 Dec 2022 08:05 )             42.8     12-28    139  |  101  |  13  ----------------------------<  107<H>  4.0   |  29  |  0.81    Ca    9.5      28 Dec 2022 08:05    TPro  7.0  /  Alb  3.9  /  TBili  0.7  /  DBili  x   /  AST  18  /  ALT  19  /  AlkPhos  82  12-28    LIVER FUNCTIONS - ( 28 Dec 2022 08:05 )  Alb: 3.9 g/dL / Pro: 7.0 g/dL / ALK PHOS: 82 U/L / ALT: 19 U/L / AST: 18 U/L / GGT: x           PT/INR - ( 28 Dec 2022 08:05 )   PT: 15.7 sec;   INR: 1.32          PTT - ( 28 Dec 2022 08:05 )  PTT:40.5 sec    CAPILLARY BLOOD GLUCOSE          COVID-19 PCR: Negative (27 Dec 2022 23:34)      RADIOLOGY & ADDITIONAL TESTS: Reviewed.

## 2022-12-28 NOTE — PRE-ANESTHESIA EVALUATION ADULT - NSANTHOSAYNRD_GEN_A_CORE
No. WILLI screening performed.  STOP BANG Legend: 0-2 = LOW Risk; 3-4 = INTERMEDIATE Risk; 5-8 = HIGH Risk

## 2022-12-28 NOTE — PATIENT PROFILE ADULT - NS PRO AD PATIENT TYPE
Health Care Proxy (HCP)
Normal vision: sees adequately in most situations; can see medication labels, newsprint

## 2022-12-28 NOTE — PATIENT PROFILE ADULT - FALL HARM RISK - HARM RISK INTERVENTIONS

## 2022-12-28 NOTE — CONSULT NOTE ADULT - SUBJECTIVE AND OBJECTIVE BOX
CHIEF COMPLAINT:    HISTORY OF PRESENT ILLNESS:    PAST MEDICAL & SURGICAL HISTORY:  HTN (hypertension)      HLD (hyperlipidemia)      Diverticulitis      H/O total knee replacement, bilateral    Allergies  No Known Allergies    MEDICATIONS:  metoprolol succinate ER 50 milliGRAM(s) Oral two times a day  atorvastatin 20 milliGRAM(s) Oral at bedtime  apixaban 5 milliGRAM(s) Oral every 12 hours      FAMILY HISTORY:  No pertinent family history in first degree relatives    SOCIAL HISTORY:    [ ] Non-smoker  [ ] Smoker  [ ] Alcohol        REVIEW OF SYSTEMS:    CONSTITUTIONAL: No fever, weight loss, or fatigue  EYES: No eye pain, visual disturbances, or discharge  ENMT:  No difficulty hearing, tinnitus, vertigo; No sinus or throat pain  NECK: No pain or stiffness  BREASTS: No pain, masses, or nipple discharge  RESPIRATORY: No cough, wheezing, chills or hemoptysis; No Shortness of Breath  CARDIOVASCULAR: No chest pain, palpitations, dizziness, or leg swelling  GASTROINTESTINAL: No abdominal or epigastric pain. No nausea, vomiting, or hematemesis; No diarrhea or constipation. No melena or hematochezia.  GENITOURINARY: No dysuria, frequency, hematuria, or incontinence  NEUROLOGICAL: No headaches, memory loss, loss of strength, numbness, or tremors  SKIN: No itching, burning, rashes, or lesions   LYMPH Nodes: No enlarged glands  ENDOCRINE: No heat or cold intolerance; No hair loss  MUSCULOSKELETAL: No joint pain or swelling; No muscle, back, or extremity pain  PSYCHIATRIC: No depression, anxiety, mood swings, or difficulty sleeping  HEME/LYMPH: No easy bruising, or bleeding gums  ALLERY AND IMMUNOLOGIC: No hives or eczema	    [ ] All others negative	  [ ] Unable to obtain    PHYSICAL EXAM:  T(C): 36.6 (12-28-22 @ 05:00), Max: 36.9 (12-27-22 @ 21:19)  HR: 58 (12-28-22 @ 08:21) (58 - 78)  BP: 129/59 (12-28-22 @ 08:21) (125/66 - 185/98)  RR: 20 (12-28-22 @ 08:21) (16 - 20)  SpO2: 92% (12-28-22 @ 08:21) (92% - 97%)    TELEMETRY: 	    ECG:     Appearance: Normal	  HEENT:   Normal oral mucosa, PERRL, EOMI	  Cardiovascular: Normal S1 S2, No JVD, No murmurs, No edema  Respiratory: Lungs clear to auscultation	  Gastrointestinal:  Soft, Non-tender, + BS	  Neurologic: A&O x 3, Non-focal  Extremities: Normal range of motion, No clubbing, cyanosis or edema  Vascular: Peripheral pulses palpable 2+ bilaterally    	  LABS:	 	                        13.9   7.10  )-----------( 210      ( 28 Dec 2022 08:05 )             42.8     12-27    140  |  100  |  19  ----------------------------<  100<H>  4.9   |  31  |  0.96    Ca    9.7      27 Dec 2022 23:34    TPro  7.7  /  Alb  4.5  /  TBili  0.6  /  DBili  x   /  AST  21  /  ALT  22  /  AlkPhos  86  12-27       CHIEF COMPLAINT: Palpitations    HISTORY OF PRESENT ILLNESS: 79 y/o female w/ PMHx of HTN, HLD, vertigo and newly diagnosed AF who presents to ED w/ c/o palpitations.     Pt reports palpitations are intermittent with most recent episode lasting about 10 minutes. She was admitted to [facility] 12/23/12/25 for palpitations and was found to be in new onset AF. She was started on metoprolol and Eliquis and discharged home. Her metoprolol dose has been gradually increased due to ongoing palpitations. She now takes Toprol 50mg BID. Her rhythm is currently sinus. EP consulted for evaluation for PVI ablation.    PAST MEDICAL & SURGICAL HISTORY:  HTN (hypertension)  HLD (hyperlipidemia)  Diverticulitis  H/O total knee replacement, bilateral    Allergies  No Known Allergies    MEDICATIONS:  metoprolol succinate ER 50 milliGRAM(s) Oral two times a day  atorvastatin 20 milliGRAM(s) Oral at bedtime  apixaban 5 milliGRAM(s) Oral every 12 hours    FAMILY HISTORY:  No pertinent family history in first degree relatives    SOCIAL HISTORY:    [X] Non-smoker  [ ] Smoker  [ ] Alcohol    REVIEW OF SYSTEMS:    CONSTITUTIONAL: No fever, weight loss, or fatigue  EYES: No eye pain, visual disturbances, or discharge  ENMT:  No difficulty hearing, tinnitus, vertigo; No sinus or throat pain  NECK: No pain or stiffness  BREASTS: No pain, masses, or nipple discharge  RESPIRATORY: No cough, wheezing, chills or hemoptysis; No Shortness of Breath  CARDIOVASCULAR: No chest pain, + palpitations, no dizziness, or leg swelling  GASTROINTESTINAL: No abdominal or epigastric pain. No nausea, vomiting, or hematemesis; No diarrhea or constipation. No melena or hematochezia.  GENITOURINARY: No dysuria, frequency, hematuria, or incontinence  NEUROLOGICAL: No headaches, memory loss, loss of strength, numbness, or tremors  SKIN: No itching, burning, rashes, or lesions   LYMPH Nodes: No enlarged glands  ENDOCRINE: No heat or cold intolerance; No hair loss  MUSCULOSKELETAL: No joint pain or swelling; No muscle, back, or extremity pain  PSYCHIATRIC: No depression, anxiety, mood swings, or difficulty sleeping  HEME/LYMPH: No easy bruising, or bleeding gums  ALLERGY AND IMMUNOLOGIC: No hives or eczema	    [X] All others negative	  [ ] Unable to obtain    PHYSICAL EXAM:  T(C): 36.6 (12-28-22 @ 05:00), Max: 36.9 (12-27-22 @ 21:19)  HR: 58 (12-28-22 @ 08:21) (58 - 78)  BP: 129/59 (12-28-22 @ 08:21) (125/66 - 185/98)  RR: 20 (12-28-22 @ 08:21) (16 - 20)  SpO2: 92% (12-28-22 @ 08:21) (92% - 97%)    TELEMETRY: SR	    ECG: SR, LBBB QRS 150ms    Appearance: Normal	  HEENT:   Normal oral mucosa, PERRL, EOMI	  Cardiovascular: Normal S1 S2, No JVD, No murmurs, No edema  Respiratory: Lungs clear to auscultation	  Gastrointestinal:  Soft, Non-tender, + BS	  Neurologic: A&O x 3, Non-focal  Extremities: Normal range of motion, No clubbing, cyanosis or edema  Vascular: Peripheral pulses palpable 2+ bilaterally    	  LABS:	 	                        13.9   7.10  )-----------( 210      ( 28 Dec 2022 08:05 )             42.8     12-27    140  |  100  |  19  ----------------------------<  100<H>  4.9   |  31  |  0.96    Ca    9.7      27 Dec 2022 23:34    TPro  7.7  /  Alb  4.5  /  TBili  0.6  /  DBili  x   /  AST  21  /  ALT  22  /  AlkPhos  86  12-27

## 2022-12-28 NOTE — PROGRESS NOTE ADULT - PROBLEM SELECTOR PLAN 1
Pt w/ recent diagnosis of Afib last week while admitted to [facility], started on bbl w/ subsequent uptitration for persistent c/o palpitations.   Plan:  - pending TTE  - f/u CCTA  -Continue Metoprolol 50 BID, uptitrate as needed.  -Continue Eliquis 5 mg BID. Pt w/ recent diagnosis of Afib last week while admitted to [facility], started on bbl w/ subsequent uptitration for persistent c/o palpitations. EP consulted, Pending ablation  Plan:  - pending TTE  - f/u CCTA  -Continue Metoprolol 50 BID, uptitrate as needed.  -Continue Eliquis 5 mg BID.

## 2022-12-28 NOTE — CONSULT NOTE ADULT - ASSESSMENT
79 y/o female w/ PMHx of HTN, HLD, vertigo and newly diagnosed AF who presents to ED w/ c/o palpitations.     - Very symptomatic PAF. No history of rhythm conversion procedures or AAD therapy. Discussed the natural progression of AF. Would recommend an ablation procedure for permanent long-term rhythm control.   - Continue Eliquis and metoprolol.   - The PVI procedure along with associated risks, including but not limited to stroke, bleeding, and pericardial effusion were discussed. All questions answered and informed consent signed.   - Please obtain CCTA for coronary evaluation prior to the ablation.

## 2022-12-29 ENCOUNTER — TRANSCRIPTION ENCOUNTER (OUTPATIENT)
Age: 77
End: 2022-12-29

## 2022-12-29 VITALS — TEMPERATURE: 99 F

## 2022-12-29 LAB
ALBUMIN SERPL ELPH-MCNC: 3.8 G/DL — SIGNIFICANT CHANGE UP (ref 3.3–5)
ALP SERPL-CCNC: 83 U/L — SIGNIFICANT CHANGE UP (ref 40–120)
ALT FLD-CCNC: 19 U/L — SIGNIFICANT CHANGE UP (ref 10–45)
ANION GAP SERPL CALC-SCNC: 12 MMOL/L — SIGNIFICANT CHANGE UP (ref 5–17)
AST SERPL-CCNC: 28 U/L — SIGNIFICANT CHANGE UP (ref 10–40)
BILIRUB SERPL-MCNC: 0.5 MG/DL — SIGNIFICANT CHANGE UP (ref 0.2–1.2)
BLD GP AB SCN SERPL QL: NEGATIVE — SIGNIFICANT CHANGE UP
BUN SERPL-MCNC: 19 MG/DL — SIGNIFICANT CHANGE UP (ref 7–23)
CALCIUM SERPL-MCNC: 9.2 MG/DL — SIGNIFICANT CHANGE UP (ref 8.4–10.5)
CHLORIDE SERPL-SCNC: 102 MMOL/L — SIGNIFICANT CHANGE UP (ref 96–108)
CO2 SERPL-SCNC: 25 MMOL/L — SIGNIFICANT CHANGE UP (ref 22–31)
CREAT SERPL-MCNC: 0.99 MG/DL — SIGNIFICANT CHANGE UP (ref 0.5–1.3)
EGFR: 59 ML/MIN/1.73M2 — LOW
GLUCOSE SERPL-MCNC: 136 MG/DL — HIGH (ref 70–99)
HCT VFR BLD CALC: 44 % — SIGNIFICANT CHANGE UP (ref 34.5–45)
HGB BLD-MCNC: 14.4 G/DL — SIGNIFICANT CHANGE UP (ref 11.5–15.5)
MAGNESIUM SERPL-MCNC: 1.8 MG/DL — SIGNIFICANT CHANGE UP (ref 1.6–2.6)
MCHC RBC-ENTMCNC: 28.2 PG — SIGNIFICANT CHANGE UP (ref 27–34)
MCHC RBC-ENTMCNC: 32.7 GM/DL — SIGNIFICANT CHANGE UP (ref 32–36)
MCV RBC AUTO: 86.1 FL — SIGNIFICANT CHANGE UP (ref 80–100)
NRBC # BLD: 0 /100 WBCS — SIGNIFICANT CHANGE UP (ref 0–0)
PHOSPHATE SERPL-MCNC: 3 MG/DL — SIGNIFICANT CHANGE UP (ref 2.5–4.5)
PLATELET # BLD AUTO: 251 K/UL — SIGNIFICANT CHANGE UP (ref 150–400)
POTASSIUM SERPL-MCNC: 4.5 MMOL/L — SIGNIFICANT CHANGE UP (ref 3.5–5.3)
POTASSIUM SERPL-SCNC: 4.5 MMOL/L — SIGNIFICANT CHANGE UP (ref 3.5–5.3)
PROT SERPL-MCNC: 7.2 G/DL — SIGNIFICANT CHANGE UP (ref 6–8.3)
RBC # BLD: 5.11 M/UL — SIGNIFICANT CHANGE UP (ref 3.8–5.2)
RBC # FLD: 14.1 % — SIGNIFICANT CHANGE UP (ref 10.3–14.5)
RH IG SCN BLD-IMP: POSITIVE — SIGNIFICANT CHANGE UP
SODIUM SERPL-SCNC: 139 MMOL/L — SIGNIFICANT CHANGE UP (ref 135–145)
WBC # BLD: 11.31 K/UL — HIGH (ref 3.8–10.5)
WBC # FLD AUTO: 11.31 K/UL — HIGH (ref 3.8–10.5)

## 2022-12-29 PROCEDURE — 84436 ASSAY OF TOTAL THYROXINE: CPT

## 2022-12-29 PROCEDURE — 86901 BLOOD TYPING SEROLOGIC RH(D): CPT

## 2022-12-29 PROCEDURE — 87635 SARS-COV-2 COVID-19 AMP PRB: CPT

## 2022-12-29 PROCEDURE — 85730 THROMBOPLASTIN TIME PARTIAL: CPT

## 2022-12-29 PROCEDURE — 99285 EMERGENCY DEPT VISIT HI MDM: CPT | Mod: 25

## 2022-12-29 PROCEDURE — 71045 X-RAY EXAM CHEST 1 VIEW: CPT

## 2022-12-29 PROCEDURE — 83735 ASSAY OF MAGNESIUM: CPT

## 2022-12-29 PROCEDURE — 36415 COLL VENOUS BLD VENIPUNCTURE: CPT

## 2022-12-29 PROCEDURE — 84484 ASSAY OF TROPONIN QUANT: CPT

## 2022-12-29 PROCEDURE — 99239 HOSP IP/OBS DSCHRG MGMT >30: CPT

## 2022-12-29 PROCEDURE — 93306 TTE W/DOPPLER COMPLETE: CPT

## 2022-12-29 PROCEDURE — 83880 ASSAY OF NATRIURETIC PEPTIDE: CPT

## 2022-12-29 PROCEDURE — 84100 ASSAY OF PHOSPHORUS: CPT

## 2022-12-29 PROCEDURE — C1766: CPT

## 2022-12-29 PROCEDURE — C1759: CPT

## 2022-12-29 PROCEDURE — C1894: CPT

## 2022-12-29 PROCEDURE — C1732: CPT

## 2022-12-29 PROCEDURE — 84443 ASSAY THYROID STIM HORMONE: CPT

## 2022-12-29 PROCEDURE — 86803 HEPATITIS C AB TEST: CPT

## 2022-12-29 PROCEDURE — 93306 TTE W/DOPPLER COMPLETE: CPT | Mod: 26

## 2022-12-29 PROCEDURE — 80053 COMPREHEN METABOLIC PANEL: CPT

## 2022-12-29 PROCEDURE — 80061 LIPID PANEL: CPT

## 2022-12-29 PROCEDURE — 86850 RBC ANTIBODY SCREEN: CPT

## 2022-12-29 PROCEDURE — C1760: CPT

## 2022-12-29 PROCEDURE — 75574 CT ANGIO HRT W/3D IMAGE: CPT

## 2022-12-29 PROCEDURE — C1769: CPT

## 2022-12-29 PROCEDURE — 83036 HEMOGLOBIN GLYCOSYLATED A1C: CPT

## 2022-12-29 PROCEDURE — 86900 BLOOD TYPING SEROLOGIC ABO: CPT

## 2022-12-29 PROCEDURE — 85347 COAGULATION TIME ACTIVATED: CPT

## 2022-12-29 PROCEDURE — 85610 PROTHROMBIN TIME: CPT

## 2022-12-29 PROCEDURE — 85027 COMPLETE CBC AUTOMATED: CPT

## 2022-12-29 PROCEDURE — C1730: CPT

## 2022-12-29 RX ORDER — ATORVASTATIN CALCIUM 80 MG/1
1 TABLET, FILM COATED ORAL
Qty: 0 | Refills: 0 | DISCHARGE

## 2022-12-29 RX ORDER — METOPROLOL TARTRATE 50 MG
1 TABLET ORAL
Qty: 0 | Refills: 0 | DISCHARGE

## 2022-12-29 RX ORDER — FUROSEMIDE 40 MG
10 TABLET ORAL ONCE
Refills: 0 | Status: COMPLETED | OUTPATIENT
Start: 2022-12-29 | End: 2022-12-29

## 2022-12-29 RX ORDER — PANTOPRAZOLE SODIUM 20 MG/1
40 TABLET, DELAYED RELEASE ORAL
Refills: 0 | Status: DISCONTINUED | OUTPATIENT
Start: 2022-12-29 | End: 2022-12-29

## 2022-12-29 RX ORDER — METOPROLOL TARTRATE 50 MG
0 TABLET ORAL
Qty: 0 | Refills: 0 | DISCHARGE

## 2022-12-29 RX ORDER — METOPROLOL TARTRATE 50 MG
50 TABLET ORAL ONCE
Refills: 0 | Status: COMPLETED | OUTPATIENT
Start: 2022-12-29 | End: 2022-12-29

## 2022-12-29 RX ORDER — METOPROLOL TARTRATE 50 MG
100 TABLET ORAL DAILY
Refills: 0 | Status: DISCONTINUED | OUTPATIENT
Start: 2022-12-29 | End: 2022-12-29

## 2022-12-29 RX ORDER — METOPROLOL TARTRATE 50 MG
100 TABLET ORAL DAILY
Refills: 0 | Status: DISCONTINUED | OUTPATIENT
Start: 2022-12-30 | End: 2022-12-29

## 2022-12-29 RX ADMIN — PANTOPRAZOLE SODIUM 40 MILLIGRAM(S): 20 TABLET, DELAYED RELEASE ORAL at 07:02

## 2022-12-29 RX ADMIN — Medication 10 MILLIGRAM(S): at 09:36

## 2022-12-29 RX ADMIN — APIXABAN 5 MILLIGRAM(S): 2.5 TABLET, FILM COATED ORAL at 09:37

## 2022-12-29 RX ADMIN — Medication 50 MILLIGRAM(S): at 07:23

## 2022-12-29 RX ADMIN — Medication 50 MILLIGRAM(S): at 06:05

## 2022-12-29 NOTE — DISCHARGE NOTE PROVIDER - HOSPITAL COURSE
#Discharge: do not delete    78 y/o female w/ PMHx of HTN,HLD, recently dx w/ new onset afib, w/ recurrent palpitations despite uptitrate bbl, now admitted for ablation tomorrow.      Problem/Plan - 1:  Problem: Afib.   Pt w/ recent diagnosis of Afib last week while admitted to [facility], started on bbl w/ subsequent up-titration for persistent c/o palpitations. CCTA done, normal. EP consulted, s/p ablation on 12/28. TTE done on 12/29 showing   Normal left ventricular size w/ mild symmetric left ventricular hypertrophy.  Plan:  -Continue Metoprolol 50 BID  -Continue Eliquis 5 mg BID.       Problem/Plan - 2:  Problem: HTN (hypertension).   Previously on Olmesartan 40 mg,  but held when started on metoprolol last week.     Problem/Plan - 3:  Problem: HLD (hyperlipidemia).   Continue Atorvastatin 20 mg. Lipid panel - wnl.      Patient was discharged to: home    New medications: none  Changes to old medications: none  Medications that were stopped: none    Items to follow up as outpatient:    Physical exam at the time of discharge:    PHYSICAL EXAM:  Constitutional: resting comfortably in bed; NAD  HEENT: NC/AT, PER, anicteric sclera, no nasal discharge; MMM  Neck: supple; no JVD or thyromegaly  Respiratory: CTA B/L; no W/R/R  Cardiac: +S1/S2; RRR; no M/R/G  Gastrointestinal: soft, NT/ND; no rebound or guarding  Extremities: WWP, no clubbing, cyanosis, or peripheral edema  Musculoskeletal: no joint swelling, tenderness or erythema  Vascular: 2+ radial  Dermatologic: skin warm, dry and intact; no rashes, wounds, or scars  Neurologic: AAOx3; CNII-XII grossly intact; no focal deficits  Psychiatric: affect and characteristics of appearance, verbalizations, behaviors are appropriate     #Discharge: do not delete    78 y/o female w/ PMHx of HTN,HLD, recently dx w/ new onset afib, w/ recurrent palpitations despite uptitrate bbl, now admitted for ablation tomorrow.      Problem/Plan - 1:  Problem: Afib.   Pt w/ recent diagnosis of Afib last week while admitted to [facility], started on bbl w/ subsequent up-titration for persistent c/o palpitations. CCTA done, normal. EP consulted, s/p ablation on 12/28. TTE done on 12/29 showing   Normal left ventricular size w/ mild symmetric left ventricular hypertrophy.  Plan:  -Continue Metoprolol 50 BID  -Continue Eliquis 5 mg BID.       Problem/Plan - 2:  Problem: HTN (hypertension).   Previously on Olmesartan 40 mg,  but held when started on metoprolol last week.     Problem/Plan - 3:  Problem: HLD (hyperlipidemia).   Continue Atorvastatin 20 mg. Lipid panel - wnl.      Patient was discharged to: home    New medications: none  Changes to old medications: none  Medications that were stopped: none    Items to follow up as outpatient:    Physical exam at the time of discharge:    PHYSICAL EXAM:  Constitutional: resting comfortably in bed; NAD  HEENT: NC/AT, PER, anicteric sclera, no nasal discharge; MMM  Neck: supple; no JVD or thyromegaly  Respiratory: CTA B/L; no W/R/R  Cardiac: +S1/S2; RRR; no M/R/G  Gastrointestinal: soft, NT/ND; no rebound or guarding  Extremities: WWP, no clubbing, cyanosis, or peripheral edema  Musculoskeletal: no joint swelling, tenderness or erythema  Vascular: 2+ radial  Dermatologic: skin warm, dry and intact; no rashes, wounds, or scars  Neurologic: AAOx3; CNII-XII grossly intact; no focal deficits  Psychiatric: affect and characteristics of appearance, verbalizations, behaviors are appropriate

## 2022-12-29 NOTE — PROGRESS NOTE ADULT - SUBJECTIVE AND OBJECTIVE BOX
EPS Progress Note    S: NAD. Denies palpitations.      T(C): 36.8 (12-29-22 @ 09:10), Max: 37.1 (12-28-22 @ 13:11)  HR: 73 (12-29-22 @ 08:55) (63 - 95)  BP: 139/63 (12-29-22 @ 08:55) (101/59 - 139/63)  RR: 20 (12-29-22 @ 08:55) (18 - 20)  SpO2: 93% (12-29-22 @ 08:55) (90% - 97%)     Telemetry: SR            Constitutional: No acute Distress  Psych: Normal affect, normal mood  Neuro: A/o x 3, No focal deficits  Neck: Supple, NO JVD  CVS: S1 S2, No M/R/G  Pulmonary: CTAB, Breathing unlabored, No Rhonchi/Rales/Wheezing  GI: Soft, Non -tender, +BS x 4 quads  Skin: No rash, warm and dry, no erythematous areas   MSK: 5/5 strength, normal range of motion, no swollen or erythematous joints.    MEDICATIONS  (STANDING):  apixaban 5 milliGRAM(s) Oral every 12 hours  atorvastatin 20 milliGRAM(s) Oral at bedtime  pantoprazole    Tablet 40 milliGRAM(s) Oral before breakfast    MEDICATIONS  (PRN):  acetaminophen     Tablet .. 650 milliGRAM(s) Oral every 6 hours PRN Temp greater or equal to 38C (100.4F), Mild Pain (1 - 3)       LABS:                                                  14.4   11.31 )-----------( 251      ( 29 Dec 2022 05:30 )             44.0     12-29    139  |  102  |  19  ----------------------------<  136<H>  4.5   |  25  |  0.99    Ca    9.2      29 Dec 2022 05:30  Phos  3.0     12-29  Mg     1.8     12-29    TPro  7.2  /  Alb  3.8  /  TBili  0.5  /  DBili  x   /  AST  28  /  ALT  19  /  AlkPhos  83  12-29    PT/INR - ( 28 Dec 2022 08:05 )   PT: 15.7 sec;   INR: 1.32       PTT - ( 28 Dec 2022 08:05 )  PTT:40.5 sec

## 2022-12-29 NOTE — PROGRESS NOTE ADULT - ASSESSMENT
77 y/o female w/ PMHx of HTN, HLD, vertigo and newly diagnosed AF who presents to ED w/ c/o palpitations, now s/p PVI ablation 12/28/22. The patient feels very well post-procedure. Groin area healing well without bleeding or hematoma.     - Post procedure instructions given, including no heavy lifting or strenuous activity for one week, and continue Eliquis BID for stroke prophylaxis.    - Please have the patient follow-up with Dr. Sam on January 25 at 2:15pm.  
78 y/o female w/ PMHx of HTN,HLD, recently dx w/ new onset afib, w/ recurrent palpitations despite uptitrate bbl, now admitted for ablation tomorrow.

## 2022-12-29 NOTE — DISCHARGE NOTE NURSING/CASE MANAGEMENT/SOCIAL WORK - PATIENT PORTAL LINK FT
You can access the FollowMyHealth Patient Portal offered by [facility] by registering at the following website: [email or web address]. By joining [facility]’s FollowMyHealth portal, you will also be able to view your health information using other applications (apps) compatible with our system.

## 2022-12-29 NOTE — DISCHARGE NOTE PROVIDER - NSDCMRMEDTOKEN_GEN_ALL_CORE_FT
Eliquis 5 mg oral tablet: 1 tab(s) orally 2 times a day  Lipitor 20 mg oral tablet: 1 tab(s) orally once a day  metoprolol succinate 50 mg oral tablet, extended release: Twice a day     Eliquis 5 mg oral tablet: 1 tab(s) orally 2 times a day  Lipitor 20 mg oral tablet: 1 tab(s) orally once a day  metoprolol succinate 50 mg oral tablet, extended release: 1 tab(s) orally 2 times a day

## 2022-12-29 NOTE — DISCHARGE NOTE PROVIDER - NSDCCPCAREPLAN_GEN_ALL_CORE_FT
PRINCIPAL DISCHARGE DIAGNOSIS  Diagnosis: Afib  Assessment and Plan of Treatment: You have a known diagnosis of atrial fibrillation prior to your admission, which is an irregular heart rhythm. This is a condition where the atria of your heart do not contract properly, which increases your risk of stroke or heart attack. It was not well controlled on just medications so you underwent a procedure called abalation. This is where a small burn or freeze is applied to cause some scarring on the inside of the heart to help break up the electrical signals that cause irregular heartbeats. This can help the heart maintain a normal heart rhythm.    -Please continue to take your home medications of Eliquis 5mg twice a day and Metoprolol 50mg twice a day     -Please follow up with your cardiologist within 1-2 weeks of discharge for evaluation and to determine whether any changes need to be made to your medications.

## 2022-12-29 NOTE — DISCHARGE NOTE PROVIDER - CARE PROVIDER_API CALL
Roosevelt Sam)  Cardiac Electrophysiology  [address], [address] [place] [address]  Phone: [phone]  Fax: [phone]  Follow Up Time: 1 week

## 2023-01-03 LAB
ISTAT ACTK (ACTIVATED CLOTTING TIME KAOLIN): 341 SEC — HIGH (ref 74–137)
ISTAT ACTK (ACTIVATED CLOTTING TIME KAOLIN): 353 SEC — HIGH (ref 74–137)
ISTAT ACTK (ACTIVATED CLOTTING TIME KAOLIN): 377 SEC — HIGH (ref 74–137)

## 2023-01-04 ENCOUNTER — NON-APPOINTMENT (OUTPATIENT)
Age: 78
End: 2023-01-04

## 2023-01-05 DIAGNOSIS — E78.5 HYPERLIPIDEMIA, UNSPECIFIED: ICD-10-CM

## 2023-01-05 DIAGNOSIS — I48.0 PAROXYSMAL ATRIAL FIBRILLATION: ICD-10-CM

## 2023-01-05 DIAGNOSIS — I10 ESSENTIAL (PRIMARY) HYPERTENSION: ICD-10-CM

## 2023-01-11 ENCOUNTER — APPOINTMENT (OUTPATIENT)
Dept: HEART AND VASCULAR | Facility: CLINIC | Age: 78
End: 2023-01-11
Payer: MEDICARE

## 2023-01-11 ENCOUNTER — NON-APPOINTMENT (OUTPATIENT)
Age: 78
End: 2023-01-11

## 2023-01-11 VITALS
HEIGHT: 63 IN | BODY MASS INDEX: 38.98 KG/M2 | WEIGHT: 220 LBS | TEMPERATURE: 97.4 F | DIASTOLIC BLOOD PRESSURE: 84 MMHG | HEART RATE: 74 BPM | SYSTOLIC BLOOD PRESSURE: 144 MMHG

## 2023-01-11 PROCEDURE — 93000 ELECTROCARDIOGRAM COMPLETE: CPT

## 2023-01-11 PROCEDURE — 99214 OFFICE O/P EST MOD 30 MIN: CPT | Mod: 25

## 2023-01-11 RX ORDER — PROPRANOLOL HYDROCHLORIDE 120 MG/1
120 CAPSULE, EXTENDED RELEASE ORAL
Qty: 90 | Refills: 3 | Status: DISCONTINUED | COMMUNITY
Start: 2018-09-19 | End: 2023-01-11

## 2023-01-11 RX ORDER — APIXABAN 5 MG/1
5 TABLET, FILM COATED ORAL
Refills: 0 | Status: ACTIVE | COMMUNITY
Start: 2023-01-11

## 2023-01-11 RX ORDER — COLCHICINE 0.6 MG/1
0.6 TABLET ORAL DAILY
Qty: 7 | Refills: 0 | Status: DISCONTINUED | COMMUNITY
Start: 2023-01-06 | End: 2023-01-11

## 2023-01-11 NOTE — PHYSICAL EXAM
[Well Developed] : well developed [Well Nourished] : well nourished [No Acute Distress] : no acute distress [5th Left ICS - MCL] : palpated at the 5th LICS in the midclavicular line [Normal Rate] : normal [Rhythm Regular] : regular [Normal S1] : normal S1 [Normal S2] : normal S2 [No Gallop] : no gallop heard [Clear Lung Fields] : clear lung fields [Good Air Entry] : good air entry [No Respiratory Distress] : no respiratory distress  [No Edema] : no edema [No Rash] : no rash [Moves all extremities] : moves all extremities [Alert and Oriented] : alert and oriented

## 2023-01-12 LAB
ANION GAP SERPL CALC-SCNC: 14 MMOL/L
BASOPHILS # BLD AUTO: 0.06 K/UL
BASOPHILS NFR BLD AUTO: 0.5 %
BUN SERPL-MCNC: 19 MG/DL
CALCIUM SERPL-MCNC: 10 MG/DL
CHLORIDE SERPL-SCNC: 102 MMOL/L
CO2 SERPL-SCNC: 25 MMOL/L
CREAT SERPL-MCNC: 0.92 MG/DL
EGFR: 65 ML/MIN/1.73M2
EOSINOPHIL # BLD AUTO: 0.12 K/UL
EOSINOPHIL NFR BLD AUTO: 1 %
GLUCOSE SERPL-MCNC: 85 MG/DL
HCT VFR BLD CALC: 46.6 %
HGB BLD-MCNC: 15.1 G/DL
IMM GRANULOCYTES NFR BLD AUTO: 0.3 %
LYMPHOCYTES # BLD AUTO: 1.69 K/UL
LYMPHOCYTES NFR BLD AUTO: 14.7 %
MAN DIFF?: NORMAL
MCHC RBC-ENTMCNC: 28.6 PG
MCHC RBC-ENTMCNC: 32.4 GM/DL
MCV RBC AUTO: 88.3 FL
MONOCYTES # BLD AUTO: 0.81 K/UL
MONOCYTES NFR BLD AUTO: 7 %
NEUTROPHILS # BLD AUTO: 8.81 K/UL
NEUTROPHILS NFR BLD AUTO: 76.5 %
NT-PROBNP SERPL-MCNC: 172 PG/ML
PLATELET # BLD AUTO: 218 K/UL
POTASSIUM SERPL-SCNC: 5 MMOL/L
RBC # BLD: 5.28 M/UL
RBC # FLD: 15 %
SODIUM SERPL-SCNC: 141 MMOL/L
TSH SERPL-ACNC: 2.71 UIU/ML
WBC # FLD AUTO: 11.53 K/UL

## 2023-01-17 NOTE — ADDENDUM
[FreeTextEntry1] : I, Payam Keller, am scribing for and the presence of Dr. Sam the following sections: HPI, PMH,Family/social history, ROS, Physical Exam, Assessment / Plan.\par  IRoosevelt, personally performed the services described in the documentation, reviewed the documentation recorded by the scribe in my presence and it accurately and completely records my words and actions.\par

## 2023-01-17 NOTE — REVIEW OF SYSTEMS
[Feeling Fatigued] : feeling fatigued [Dyspnea on exertion] : dyspnea during exertion [Joint Pain] : joint pain [Negative] : Heme/Lymph [Fever] : no fever [Weight Gain (___ Lbs)] : no recent weight gain [Chills] : no chills [Weight Loss (___ Lbs)] : no recent weight loss [SOB] : no shortness of breath [Chest Discomfort] : no chest discomfort [Palpitations] : no palpitations [Orthopnea] : no orthopnea [Syncope] : no syncope [Cough] : no cough

## 2023-01-17 NOTE — HISTORY OF PRESENT ILLNESS
[FreeTextEntry1] : 77 year old female with HTN, HLD and atrial fibrillation s/p ablation 12/2022, who presents for follow up.\par \par She came to [facility] 12/2022 with palpitations and SOB found to be in afib.  Echo showed normal EF.  She was ultimately ablated with a pulmonary vein isolation.  She was discharged on Eliquis and Toprol.  She states that while she feels better post ablation she still has fatigue and leg pain bilaterally that "also feels like it is all over her body".  She states at rest her heart rate is in the 70s and when she walks it is in the 90s but can go up to 130bpm and this upsets her.  No syncope, orthopnea, PND, edema or chest pain.  She is compliant with Eliquis.  \par

## 2023-01-17 NOTE — DISCUSSION/SUMMARY
[EKG obtained to assist in diagnosis and management of assessed problem(s)] : EKG obtained to assist in diagnosis and management of assessed problem(s) [FreeTextEntry1] : 77 year old female with HTN, HLD and atrial fibrillation s/p ablation 12/2022, who presents for follow up.  She is in NSR today on exam and EKG.  She is 2 weeks post ablation and we discussed the blanking period and it is possible she is having episodes of afib.  I have asked her to get blood work - BMP, CBC, BNP and TSH given her symptoms.  I have asked her to follow up in 2 weeks to reassess and we will call her with her blood test results. She remains on Eliquis and the rest of her medications.  She knows to call with any questions or concerns.

## 2023-01-20 NOTE — PHYSICAL EXAM
[Normal] : normal gait, coordination grossly intact, no focal deficits and deep tendon reflexes were 2+ and symmetric [de-identified] : Myofascial tenderness of the neck, Restricted ROM secondary to stiffness. [Fatigue] : fatigue [Palpitations] : palpitations [Headache] : headache [Insomnia] : insomnia [Anxiety] : anxiety [Negative] : Heme/Lymph [Suicidal] : not suicidal [Depression] : no depression [de-identified] : tingling in feet

## 2023-01-23 ENCOUNTER — EMERGENCY (EMERGENCY)
Facility: HOSPITAL | Age: 78
LOS: 1 days | Discharge: HOME | End: 2023-01-23
Attending: EMERGENCY MEDICINE | Admitting: EMERGENCY MEDICINE
Payer: MEDICARE

## 2023-01-23 VITALS
HEART RATE: 97 BPM | DIASTOLIC BLOOD PRESSURE: 77 MMHG | TEMPERATURE: 98 F | RESPIRATION RATE: 17 BRPM | SYSTOLIC BLOOD PRESSURE: 126 MMHG | OXYGEN SATURATION: 96 %

## 2023-01-23 VITALS
SYSTOLIC BLOOD PRESSURE: 146 MMHG | DIASTOLIC BLOOD PRESSURE: 70 MMHG | TEMPERATURE: 98 F | RESPIRATION RATE: 20 BRPM | WEIGHT: 199.96 LBS | HEIGHT: 63 IN | OXYGEN SATURATION: 95 % | HEART RATE: 105 BPM

## 2023-01-23 DIAGNOSIS — Z20.822 CONTACT WITH AND (SUSPECTED) EXPOSURE TO COVID-19: ICD-10-CM

## 2023-01-23 DIAGNOSIS — R53.83 OTHER FATIGUE: ICD-10-CM

## 2023-01-23 DIAGNOSIS — R53.1 WEAKNESS: ICD-10-CM

## 2023-01-23 DIAGNOSIS — I44.7 LEFT BUNDLE-BRANCH BLOCK, UNSPECIFIED: ICD-10-CM

## 2023-01-23 DIAGNOSIS — Z79.01 LONG TERM (CURRENT) USE OF ANTICOAGULANTS: ICD-10-CM

## 2023-01-23 DIAGNOSIS — Z96.653 PRESENCE OF ARTIFICIAL KNEE JOINT, BILATERAL: Chronic | ICD-10-CM

## 2023-01-23 DIAGNOSIS — I10 ESSENTIAL (PRIMARY) HYPERTENSION: ICD-10-CM

## 2023-01-23 DIAGNOSIS — E78.5 HYPERLIPIDEMIA, UNSPECIFIED: ICD-10-CM

## 2023-01-23 DIAGNOSIS — I48.91 UNSPECIFIED ATRIAL FIBRILLATION: ICD-10-CM

## 2023-01-23 DIAGNOSIS — Z79.82 LONG TERM (CURRENT) USE OF ASPIRIN: ICD-10-CM

## 2023-01-23 DIAGNOSIS — R53.81 OTHER MALAISE: ICD-10-CM

## 2023-01-23 DIAGNOSIS — R00.0 TACHYCARDIA, UNSPECIFIED: ICD-10-CM

## 2023-01-23 LAB
ANION GAP SERPL CALC-SCNC: 15 MMOL/L — SIGNIFICANT CHANGE UP (ref 5–17)
APPEARANCE UR: CLEAR — SIGNIFICANT CHANGE UP
APTT BLD: 40 SEC — HIGH (ref 27.5–35.5)
BACTERIA # UR AUTO: PRESENT /HPF
BILIRUB UR-MCNC: NEGATIVE — SIGNIFICANT CHANGE UP
BUN SERPL-MCNC: 28 MG/DL — HIGH (ref 7–23)
CALCIUM SERPL-MCNC: 10 MG/DL — SIGNIFICANT CHANGE UP (ref 8.4–10.5)
CHLORIDE SERPL-SCNC: 96 MMOL/L — SIGNIFICANT CHANGE UP (ref 96–108)
CO2 SERPL-SCNC: 23 MMOL/L — SIGNIFICANT CHANGE UP (ref 22–31)
COLOR SPEC: YELLOW — SIGNIFICANT CHANGE UP
CREAT SERPL-MCNC: 0.94 MG/DL — SIGNIFICANT CHANGE UP (ref 0.5–1.3)
DIFF PNL FLD: ABNORMAL
EGFR: 63 ML/MIN/1.73M2 — SIGNIFICANT CHANGE UP
EPI CELLS # UR: SIGNIFICANT CHANGE UP /HPF (ref 0–5)
GLUCOSE SERPL-MCNC: 136 MG/DL — HIGH (ref 70–99)
GLUCOSE UR QL: NEGATIVE — SIGNIFICANT CHANGE UP
HCT VFR BLD CALC: 46.4 % — HIGH (ref 34.5–45)
HGB BLD-MCNC: 15.3 G/DL — SIGNIFICANT CHANGE UP (ref 11.5–15.5)
INR BLD: 1.37 — HIGH (ref 0.88–1.16)
KETONES UR-MCNC: NEGATIVE — SIGNIFICANT CHANGE UP
LEUKOCYTE ESTERASE UR-ACNC: NEGATIVE — SIGNIFICANT CHANGE UP
MAGNESIUM SERPL-MCNC: 1.7 MG/DL — SIGNIFICANT CHANGE UP (ref 1.6–2.6)
MCHC RBC-ENTMCNC: 28.6 PG — SIGNIFICANT CHANGE UP (ref 27–34)
MCHC RBC-ENTMCNC: 33 GM/DL — SIGNIFICANT CHANGE UP (ref 32–36)
MCV RBC AUTO: 86.7 FL — SIGNIFICANT CHANGE UP (ref 80–100)
NITRITE UR-MCNC: NEGATIVE — SIGNIFICANT CHANGE UP
NRBC # BLD: 0 /100 WBCS — SIGNIFICANT CHANGE UP (ref 0–0)
NT-PROBNP SERPL-SCNC: 29 PG/ML — SIGNIFICANT CHANGE UP (ref 0–300)
PH UR: 6 — SIGNIFICANT CHANGE UP (ref 5–8)
PLATELET # BLD AUTO: 209 K/UL — SIGNIFICANT CHANGE UP (ref 150–400)
POTASSIUM SERPL-MCNC: 3.7 MMOL/L — SIGNIFICANT CHANGE UP (ref 3.5–5.3)
POTASSIUM SERPL-SCNC: 3.7 MMOL/L — SIGNIFICANT CHANGE UP (ref 3.5–5.3)
PROT UR-MCNC: NEGATIVE MG/DL — SIGNIFICANT CHANGE UP
PROTHROM AB SERPL-ACNC: 16.3 SEC — HIGH (ref 10.5–13.4)
RAPID RVP RESULT: SIGNIFICANT CHANGE UP
RBC # BLD: 5.35 M/UL — HIGH (ref 3.8–5.2)
RBC # FLD: 14.4 % — SIGNIFICANT CHANGE UP (ref 10.3–14.5)
RBC CASTS # UR COMP ASSIST: < 5 /HPF — SIGNIFICANT CHANGE UP
SARS-COV-2 RNA SPEC QL NAA+PROBE: SIGNIFICANT CHANGE UP
SODIUM SERPL-SCNC: 134 MMOL/L — LOW (ref 135–145)
SP GR SPEC: 1.01 — SIGNIFICANT CHANGE UP (ref 1–1.03)
UROBILINOGEN FLD QL: 0.2 E.U./DL — SIGNIFICANT CHANGE UP
WBC # BLD: 12.67 K/UL — HIGH (ref 3.8–10.5)
WBC # FLD AUTO: 12.67 K/UL — HIGH (ref 3.8–10.5)
WBC UR QL: < 5 /HPF — SIGNIFICANT CHANGE UP

## 2023-01-23 PROCEDURE — 99285 EMERGENCY DEPT VISIT HI MDM: CPT

## 2023-01-23 PROCEDURE — 83880 ASSAY OF NATRIURETIC PEPTIDE: CPT

## 2023-01-23 PROCEDURE — 85610 PROTHROMBIN TIME: CPT

## 2023-01-23 PROCEDURE — 85027 COMPLETE CBC AUTOMATED: CPT

## 2023-01-23 PROCEDURE — 99285 EMERGENCY DEPT VISIT HI MDM: CPT | Mod: 25

## 2023-01-23 PROCEDURE — 85730 THROMBOPLASTIN TIME PARTIAL: CPT

## 2023-01-23 PROCEDURE — 71045 X-RAY EXAM CHEST 1 VIEW: CPT | Mod: 26

## 2023-01-23 PROCEDURE — 0225U NFCT DS DNA&RNA 21 SARSCOV2: CPT

## 2023-01-23 PROCEDURE — 83735 ASSAY OF MAGNESIUM: CPT

## 2023-01-23 PROCEDURE — 81001 URINALYSIS AUTO W/SCOPE: CPT

## 2023-01-23 PROCEDURE — 80048 BASIC METABOLIC PNL TOTAL CA: CPT

## 2023-01-23 PROCEDURE — 71045 X-RAY EXAM CHEST 1 VIEW: CPT

## 2023-01-23 PROCEDURE — 93005 ELECTROCARDIOGRAM TRACING: CPT | Mod: 76

## 2023-01-23 PROCEDURE — 36415 COLL VENOUS BLD VENIPUNCTURE: CPT

## 2023-01-23 RX ORDER — METOPROLOL TARTRATE 50 MG
75 TABLET ORAL ONCE
Refills: 0 | Status: COMPLETED | OUTPATIENT
Start: 2023-01-23 | End: 2023-01-23

## 2023-01-23 RX ADMIN — Medication 75 MILLIGRAM(S): at 20:37

## 2023-01-23 NOTE — ED PROVIDER NOTE - NS ED ROS FT
Constitutional: No fever or chills.   Eyes: No pain, blurry vision, or discharge.  ENMT: No hearing changes, pain, discharge or infections. No neck pain or stiffness.  Cardiac: No chest pain, SOB or edema. No chest pain with exertion.  Respiratory: No cough or respiratory distress. No hemoptysis. No history of asthma or RAD.  GI: No nausea, vomiting, diarrhea or abdominal pain.  : No dysuria, frequency or burning.  MS: No myalgia, muscle weakness, joint pain or back pain.  Neuro: No headache or focal weakness. No LOC.  Skin: No skin rash.   Endocrine: No history of thyroid disease or diabetes.  Except as documented in the HPI, all other systems are negative.

## 2023-01-23 NOTE — ED ADULT NURSE NOTE - OBJECTIVE STATEMENT
[facility] called STEMI Alert for patient,   Pt arrived to ED, EKG, IV, and blood work obtained. Pt seen to have RBBB, no STEMI as per cardiology and ED attending. Pt endorsing weakness in bilateral legs and generalized fatigue. Endorsing palpitations this afternoon but states has since resolved. Pt denies chest pain. States she took her pulse which was 120 bpm.     Pt reports feeling better at this time.

## 2023-01-23 NOTE — ED PROVIDER NOTE - PROGRESS NOTE DETAILS
D/w Dr Ruiz - increase Metoprolol to 50 mg QAM, 100 mg QPM. He will see pt in the office on Wed 1/25. Holter monitor being delivered to pt's house tomorrow D/w Dr Ruiz - increase Metoprolol to 50 mg QAM, 100 mg QPM. He will see pt in the office on Wed 1/25. Holter monitor being delivered to pt's house tomorrow. I have d/w pt and  the plan, as well as Dr Ruiz has spoken directly with them as well. They both demonstrate understanding of plan

## 2023-01-23 NOTE — ED PROVIDER NOTE - PHYSICAL EXAMINATION
Constitutional: Well appearing, awake, alert, oriented to person, place, time/situation and in no apparent distress.  ENMT: Airway patent. Normal MM  Eyes: Clear bilaterally  Cardiac: Normal rate, regular rhythm.  Heart sounds S1, S2.  No murmurs, rubs or gallops. no JVD or LE edema  Respiratory: Breaths sounds equal and clear b/l. No W/R/R. No increased WOB, tachypnea, hypoxia, or accessory mm use. Pt speaks in full sentences.   Gastrointestinal: Abd soft, NT, ND, NABS. No guarding, rebound, or rigidity. No pulsatile abdominal masses.   Musculoskeletal: Range of motion is not limited. no calf ttp  Neuro: Alert and oriented x 3, face symmetric and speech fluent. Strength 5/5 x 4 ext and symmetric, nml gross motor movement, nml gait. No focal deficits noted.  Skin: Skin normal color for race, warm, dry and intact. No evidence of rash.  Psych: Alert and oriented to person, place, time/situation. normal mood and affect. no apparent risk to self or others.

## 2023-01-23 NOTE — ED ADULT TRIAGE NOTE - CHIEF COMPLAINT QUOTE
Pt brought in by EMS for generalized weakness and palpitations. On EMS arrival, they noted possible ST elevations. Pt denies chest pain, shortness of breath, fevers. On arrival, pt awake and alert and c/o nausea. Given 324 of Aspirin by EMS. Had an ablation for Afib 3 weeks ago.

## 2023-01-23 NOTE — ED PROVIDER NOTE - OBJECTIVE STATEMENT
Pt w/ PMHx of HTN on Metoprolol, HLD on Atorvastatin, AFib s/p ablation on 12/28/22 her w/ Dr Carlin discharged on Meto Succ 50 mg BID, Eliquis 5 BID, BIBA as a STEMI alert by [facility]. Pt reports sometimes w/ activity her HR goes to low 100s, and typically gets better w/ rest. however today, her HR when to 120 w/ gen weakness and malaise, prompting her to call 911. Denies CP, SOB, lightheadedness, near syncope, syncope, n/v. Pt given  mg by EMS. Pt made STEMI alert for LBBB (old). Denies orthopnea, PND, LE edema.

## 2023-01-23 NOTE — ED PROVIDER NOTE - NSFOLLOWUPINSTRUCTIONS_ED_ALL_ED_FT
Your heart was in a sinus rhythm (not atrial fibrillation) that was mildly fast (low 100s), called sinus tachycardia. It improved on its own. Your EKG was unchanged from before, with a left bundle branch block. Your blood work and chest xray showed no acute abnormalities.     Your care was discussed with your cardiologist Dr Carlin.     Your received your evening Metoprolol and extra dosing here in the Emergency Department tonight. Therefore, DO NOT TAKE ANY FURTHER METOPROLOL TONIGHT. TOMORROW, CONTINUE THE 50 MG IN THE MORNING, AND INCREASE THE NIGHTTIME DOSE  MG.     See Dr Carlin in the office Wednesday.       Sinus Tachycardia    The heart showing the sinus node, with arrows to show the electrical pathway through the heart muscle.   Sinus tachycardia is a kind of fast heartbeat. In sinus tachycardia, the heart beats more than 100 times a minute. Sinus tachycardia starts in a part of the heart called the sinus node. Sinus tachycardia may be harmless, or it may be a sign of a serious condition.      What are the causes?    This condition may be caused by:  •Exercise or exertion.      •A fever.      •Pain.      •Loss of body fluids (dehydration).      •Severe bleeding (hemorrhage).      •Anxiety and stress.    •Certain substances, including:  •Alcohol.      •Caffeine.      •Tobacco and nicotine products.      •Cold medicines.      •Illegal drugs.      •Medical conditions including:  •Heart disease.      •An infection.      •An overactive thyroid (hyperthyroidism).      •A lack of red blood cells (anemia).          What are the signs or symptoms?    Symptoms of this condition include:  •A feeling that the heart is beating quickly (palpitations).      •Suddenly noticing your heartbeat (cardiac awareness).      •Dizziness.      •Tiredness (fatigue).      •Shortness of breath.      •Chest pain.      •Nausea.      •Fainting.        How is this diagnosed?    This condition is diagnosed with:  •A physical exam.    •Other tests, such as:  •Blood tests.      •An electrocardiogram (ECG). This test measures the electrical activity of the heart.      •Ambulatory cardiac monitor. This records your heartbeats for 24 hours or more.        You may be referred to a heart specialist (cardiologist).      How is this treated?    Treatment for this condition depends on the cause or the underlying condition. Treatment may involve:  •Treating the underlying condition.      •Taking new medicines or changing your current medicines as told by your health care provider.      •Making changes to your diet or lifestyle.        Follow these instructions at home:      Lifestyle   A sign telling the reader not to smoke.   • Do not use any products that contain nicotine or tobacco, such as cigarettes and e-cigarettes. If you need help quitting, ask your health care provider.      • Do not use illegal drugs, such as cocaine.      •Learn relaxation methods to help you when you get stressed or anxious. These include deep breathing.      •Avoid caffeine or other stimulants.        Alcohol use   A sign asking the reader not to drink beer, wine, or hard liquor. • Do not drink alcohol if:  •Your health care provider tells you not to drink.      •You are pregnant, may be pregnant, or are planning to become pregnant.      •If you drink alcohol, limit how much you have:  •0–1 drink a day for women.      •0–2 drinks a day for men.        •Be aware of how much alcohol is in your drink. In the [place] one drink equals one typical bottle of beer (12 oz), one-half glass of wine (5 oz), or one shot of hard liquor (1½ oz).      General instructions     •Drink enough fluids to keep your urine pale yellow.      •Take over-the-counter and prescription medicines only as told by your health care provider.      •Keep all follow-up visits as told by your health care provider. This is important.        Contact a health care provider if you have:    •A fever.      •Vomiting or diarrhea that does not go away.        Get help right away if you:    •Have pain in your chest, upper arms, jaw, or neck.      •Become weak or dizzy.      •Feel faint.      •Have palpitations that do not go away.        Summary    •In sinus tachycardia, the heart beats more than 100 times a minute.      •Sinus tachycardia may be harmless, or it may be a sign of a serious condition.      •Treatment for this condition depends on the cause or the underlying condition.      •Get help right away if you have pain in your chest, upper arms, jaw, or neck.      This information is not intended to replace advice given to you by your health care provider. Make sure you discuss any questions you have with your health care provider.    Left Bundle Branch Block       Left bundle branch block (LBBB) is a problem with the way that electrical impulses pass through the heart (electrical conduction abnormality). The heart depends on an electrical pulse to beat normally. The electrical signal for a heartbeat starts in the upper chambers of the heart (atria) and then travels to the two lower chambers (left and rightventricles). An LBBB is a partial or complete block of the pathway that carries the signal to the left ventricle. If you have LBBB, the left side of your heart does not beat normally.    LBBB may be a warning sign of heart disease.      What are the causes?    This condition may be caused by:  •Heart disease.      •Disease of the arteries in the heart (arteriosclerosis).      •Stiffening or weakening of heart muscle (cardiomyopathy).      •Infection of heart muscle (myocarditis).      •High blood pressure.      In some cases, the cause may not be known.      What increases the risk?    The following factors may make you more likely to develop this condition:  •Being male.      •Being 50 years of age or older.      •Having heart disease.      •Having had a heart attack or heart surgery.        What are the signs or symptoms?    This condition may not cause any symptoms. If you do have symptoms, they may include:  •Feeling dizzy or light-headed.      •Fainting.        How is this diagnosed?    This condition may be diagnosed based on an electrocardiogram (ECG). It is often diagnosed when an ECG is done as part of a routine physical or to help find the cause of fainting spells.    You may also have imaging tests to find out more about your condition. These may include:  •Chest X-rays.      •Echocardiogram.        How is this treated?    If you do not have symptoms or any other type of heart disease, you may not need treatment for this condition. However, you may need to see your health care provider more often because LBBB can be a warning sign of future heart problems. You may get treatment for other heart problems or high blood pressure.    If LBBB causes symptoms or other heart problems, you may need to have an electrical device (pacemaker) implanted under the skin of your chest. A pacemaker sends electrical signals to your heart to keep it beating normally.      Follow these instructions at home:      Lifestyle      •Follow instructions from your health care provider about eating or drinking restrictions.      •Follow a heart-healthy diet and maintain a healthy weight. Work with a dietitian to create an eating plan that is best for you.      • Do not use any products that contain nicotine or tobacco, such as cigarettes, e-cigarettes, and chewing tobacco. If you need help quitting, ask your health care provider.      Activity     •Get regular exercise as told by your health care provider.      •Return to your normal activities as told by your health care provider. Ask your health care provider what activities are safe for you.      General instructions     •Take over-the-counter and prescription medicines only as told by your health care provider.      •Keep all follow-up visits as told by your health care provider. This is important.        Contact a health care provider if:    •You feel light-headed.      •You faint.        Get help right away if:    •You have chest pain.      •You have trouble breathing.      These symptoms may represent a serious problem that is an emergency. Do not wait to see if the symptoms will go away. Get medical help right away. Call your local emergency services (911 in the [place] Do not drive yourself to the hospital.        Summary    •For the heart to beat normally, an electrical signal must travel to the lower left chamber of the heart. Left bundle branch block (LBBB) is a partial or complete block of the pathway that carries that signal.      •This condition may not cause any symptoms. In some cases, a person may feel dizzy or light-headed or may faint.      •Treatment may not be needed for LBBB if you do not have symptoms or any other type of heart disease.      •You may need to see your health care provider more often because LBBB can be a warning sign of future heart problems.      This information is not intended to replace advice given to you by your health care provider. Make sure you discuss any questions you have with your health care provider. Your heart was in a sinus rhythm (not atrial fibrillation) that was mildly fast (low 100s), called sinus tachycardia. It improved on its own. Your EKG was unchanged from before, with a left bundle branch block. Your blood work and chest xray showed no acute abnormalities. Your urinalysis was negative for infection. Your COVID19 test was also negative.     Your care was discussed with your cardiologist Dr Carlin.     Your received your evening Metoprolol and extra dosing here in the Emergency Department tonight. Therefore, DO NOT TAKE ANY FURTHER METOPROLOL TONIGHT. TOMORROW, CONTINUE THE 50 MG IN THE MORNING, AND INCREASE THE NIGHTTIME DOSE  MG.     See Dr Carlin in the office Wednesday.       Sinus Tachycardia    The heart showing the sinus node, with arrows to show the electrical pathway through the heart muscle.   Sinus tachycardia is a kind of fast heartbeat. In sinus tachycardia, the heart beats more than 100 times a minute. Sinus tachycardia starts in a part of the heart called the sinus node. Sinus tachycardia may be harmless, or it may be a sign of a serious condition.      What are the causes?    This condition may be caused by:  •Exercise or exertion.      •A fever.      •Pain.      •Loss of body fluids (dehydration).      •Severe bleeding (hemorrhage).      •Anxiety and stress.    •Certain substances, including:  •Alcohol.      •Caffeine.      •Tobacco and nicotine products.      •Cold medicines.      •Illegal drugs.      •Medical conditions including:  •Heart disease.      •An infection.      •An overactive thyroid (hyperthyroidism).      •A lack of red blood cells (anemia).          What are the signs or symptoms?    Symptoms of this condition include:  •A feeling that the heart is beating quickly (palpitations).      •Suddenly noticing your heartbeat (cardiac awareness).      •Dizziness.      •Tiredness (fatigue).      •Shortness of breath.      •Chest pain.      •Nausea.      •Fainting.        How is this diagnosed?    This condition is diagnosed with:  •A physical exam.    •Other tests, such as:  •Blood tests.      •An electrocardiogram (ECG). This test measures the electrical activity of the heart.      •Ambulatory cardiac monitor. This records your heartbeats for 24 hours or more.        You may be referred to a heart specialist (cardiologist).      How is this treated?    Treatment for this condition depends on the cause or the underlying condition. Treatment may involve:  •Treating the underlying condition.      •Taking new medicines or changing your current medicines as told by your health care provider.      •Making changes to your diet or lifestyle.        Follow these instructions at home:      Lifestyle   A sign telling the reader not to smoke.   • Do not use any products that contain nicotine or tobacco, such as cigarettes and e-cigarettes. If you need help quitting, ask your health care provider.      • Do not use illegal drugs, such as cocaine.      •Learn relaxation methods to help you when you get stressed or anxious. These include deep breathing.      •Avoid caffeine or other stimulants.        Alcohol use   A sign asking the reader not to drink beer, wine, or hard liquor. • Do not drink alcohol if:  •Your health care provider tells you not to drink.      •You are pregnant, may be pregnant, or are planning to become pregnant.      •If you drink alcohol, limit how much you have:  •0–1 drink a day for women.      •0–2 drinks a day for men.        •Be aware of how much alcohol is in your drink. In the [place] one drink equals one typical bottle of beer (12 oz), one-half glass of wine (5 oz), or one shot of hard liquor (1½ oz).      General instructions     •Drink enough fluids to keep your urine pale yellow.      •Take over-the-counter and prescription medicines only as told by your health care provider.      •Keep all follow-up visits as told by your health care provider. This is important.        Contact a health care provider if you have:    •A fever.      •Vomiting or diarrhea that does not go away.        Get help right away if you:    •Have pain in your chest, upper arms, jaw, or neck.      •Become weak or dizzy.      •Feel faint.      •Have palpitations that do not go away.        Summary    •In sinus tachycardia, the heart beats more than 100 times a minute.      •Sinus tachycardia may be harmless, or it may be a sign of a serious condition.      •Treatment for this condition depends on the cause or the underlying condition.      •Get help right away if you have pain in your chest, upper arms, jaw, or neck.      This information is not intended to replace advice given to you by your health care provider. Make sure you discuss any questions you have with your health care provider.    Left Bundle Branch Block       Left bundle branch block (LBBB) is a problem with the way that electrical impulses pass through the heart (electrical conduction abnormality). The heart depends on an electrical pulse to beat normally. The electrical signal for a heartbeat starts in the upper chambers of the heart (atria) and then travels to the two lower chambers (left and rightventricles). An LBBB is a partial or complete block of the pathway that carries the signal to the left ventricle. If you have LBBB, the left side of your heart does not beat normally.    LBBB may be a warning sign of heart disease.      What are the causes?    This condition may be caused by:  •Heart disease.      •Disease of the arteries in the heart (arteriosclerosis).      •Stiffening or weakening of heart muscle (cardiomyopathy).      •Infection of heart muscle (myocarditis).      •High blood pressure.      In some cases, the cause may not be known.      What increases the risk?    The following factors may make you more likely to develop this condition:  •Being male.      •Being 50 years of age or older.      •Having heart disease.      •Having had a heart attack or heart surgery.        What are the signs or symptoms?    This condition may not cause any symptoms. If you do have symptoms, they may include:  •Feeling dizzy or light-headed.      •Fainting.        How is this diagnosed?    This condition may be diagnosed based on an electrocardiogram (ECG). It is often diagnosed when an ECG is done as part of a routine physical or to help find the cause of fainting spells.    You may also have imaging tests to find out more about your condition. These may include:  •Chest X-rays.      •Echocardiogram.        How is this treated?    If you do not have symptoms or any other type of heart disease, you may not need treatment for this condition. However, you may need to see your health care provider more often because LBBB can be a warning sign of future heart problems. You may get treatment for other heart problems or high blood pressure.    If LBBB causes symptoms or other heart problems, you may need to have an electrical device (pacemaker) implanted under the skin of your chest. A pacemaker sends electrical signals to your heart to keep it beating normally.      Follow these instructions at home:      Lifestyle      •Follow instructions from your health care provider about eating or drinking restrictions.      •Follow a heart-healthy diet and maintain a healthy weight. Work with a dietitian to create an eating plan that is best for you.      • Do not use any products that contain nicotine or tobacco, such as cigarettes, e-cigarettes, and chewing tobacco. If you need help quitting, ask your health care provider.      Activity     •Get regular exercise as told by your health care provider.      •Return to your normal activities as told by your health care provider. Ask your health care provider what activities are safe for you.      General instructions     •Take over-the-counter and prescription medicines only as told by your health care provider.      •Keep all follow-up visits as told by your health care provider. This is important.        Contact a health care provider if:    •You feel light-headed.      •You faint.        Get help right away if:    •You have chest pain.      •You have trouble breathing.      These symptoms may represent a serious problem that is an emergency. Do not wait to see if the symptoms will go away. Get medical help right away. Call your local emergency services (911 in the [place] Do not drive yourself to the hospital.        Summary    •For the heart to beat normally, an electrical signal must travel to the lower left chamber of the heart. Left bundle branch block (LBBB) is a partial or complete block of the pathway that carries that signal.      •This condition may not cause any symptoms. In some cases, a person may feel dizzy or light-headed or may faint.      •Treatment may not be needed for LBBB if you do not have symptoms or any other type of heart disease.      •You may need to see your health care provider more often because LBBB can be a warning sign of future heart problems.      This information is not intended to replace advice given to you by your health care provider. Make sure you discuss any questions you have with your health care provider.

## 2023-01-23 NOTE — ED PROVIDER NOTE - CLINICAL SUMMARY MEDICAL DECISION MAKING FREE TEXT BOX
Pt called in by EMS as STEMI alert for LBBB. No CP, SOB. Interventional cardiology fellow Dr JOSE CARLOS Ricci in the ED for pt evaluation 2/2 STEMI alert on arrival - old LBBB, no CP, SOB - no STEMI. Pt c/o elevated HR and fatigue, no other sx. ST on arrival. Reports compliance w/ meds. Recent ablation for AF. Dr Oviedo contacted by Dr Ricci. DDx includes but not limited to anxiety, infection, anemia, electrolyte imbalance, paroxsymal arrythmia, less likely other pathology. CHeck labs, UA, CXR, observe / monitor in ED. Dispo pending w/u and clinical status

## 2023-01-23 NOTE — ED ADULT TRIAGE NOTE - IDEAL BODY WEIGHT(KG)
52 Opioid Counseling: I discussed with the patient the potential side effects of opioids including but not limited to addiction, altered mental status, and depression. I stressed avoiding alcohol, benzodiazepines, muscle relaxants and sleep aids unless specifically okayed by a physician. The patient verbalized understanding of the proper use and possible adverse effects of opioids. All of the patient's questions and concerns were addressed. They were instructed to flush the remaining pills down the toilet if they did not need them for pain.

## 2023-01-23 NOTE — ED PROVIDER NOTE - PATIENT PORTAL LINK FT
You can access the FollowMyHealth Patient Portal offered by [facility] by registering at the following website: [email or web address]. By joining [facility]’s FollowMyHealth portal, you will also be able to view your health information using other applications (apps) compatible with our system.

## 2023-01-23 NOTE — ED ADULT NURSE NOTE - CAS ELECT INFOMATION PROVIDED
Pt verbalized understanding of d/c instructions. All PIV''s reomved. Pt left ED with her  and walked independently with a smooth and steady gait./DC instructions

## 2023-01-25 ENCOUNTER — APPOINTMENT (OUTPATIENT)
Dept: HEART AND VASCULAR | Facility: CLINIC | Age: 78
End: 2023-01-25
Payer: MEDICARE

## 2023-01-25 ENCOUNTER — NON-APPOINTMENT (OUTPATIENT)
Age: 78
End: 2023-01-25

## 2023-01-25 VITALS
BODY MASS INDEX: 38.98 KG/M2 | DIASTOLIC BLOOD PRESSURE: 79 MMHG | HEART RATE: 76 BPM | WEIGHT: 220 LBS | TEMPERATURE: 96 F | SYSTOLIC BLOOD PRESSURE: 135 MMHG | HEIGHT: 63 IN

## 2023-01-25 PROCEDURE — 93000 ELECTROCARDIOGRAM COMPLETE: CPT

## 2023-01-25 PROCEDURE — 99214 OFFICE O/P EST MOD 30 MIN: CPT | Mod: 25

## 2023-01-27 ENCOUNTER — APPOINTMENT (OUTPATIENT)
Dept: INTERNAL MEDICINE | Facility: CLINIC | Age: 78
End: 2023-01-27
Payer: MEDICARE

## 2023-01-27 VITALS
OXYGEN SATURATION: 98 % | WEIGHT: 222 LBS | HEIGHT: 63 IN | HEART RATE: 73 BPM | SYSTOLIC BLOOD PRESSURE: 147 MMHG | DIASTOLIC BLOOD PRESSURE: 76 MMHG | BODY MASS INDEX: 39.34 KG/M2 | TEMPERATURE: 97.2 F

## 2023-01-27 DIAGNOSIS — R00.2 PALPITATIONS: ICD-10-CM

## 2023-01-27 DIAGNOSIS — D72.829 ELEVATED WHITE BLOOD CELL COUNT, UNSPECIFIED: ICD-10-CM

## 2023-01-27 PROCEDURE — 36415 COLL VENOUS BLD VENIPUNCTURE: CPT

## 2023-01-27 PROCEDURE — G0439: CPT

## 2023-01-27 RX ORDER — MECLIZINE HYDROCHLORIDE 12.5 MG/1
12.5 TABLET ORAL
Qty: 90 | Refills: 0 | Status: DISCONTINUED | COMMUNITY
Start: 2021-09-16 | End: 2023-01-27

## 2023-01-27 NOTE — HISTORY OF PRESENT ILLNESS
[FreeTextEntry1] : Patient presents for annual wellness visit. [de-identified] : Patient reports she recently had A-fib. S/p ablation.\par Denies any lightheadedness, dizziness, palpitations or SOB.\par Denies any abdominal pain, urinary symptom or change in bowel habits.\par \par Planning on scheduling mammogram.\par Denies any alcohol use.\par

## 2023-01-27 NOTE — ADDENDUM
[FreeTextEntry1] : I, Polina Andino, acted as a scribe on behalf of Dr. Rohit Warren MD, on 01/27/2023. \par \par All medical entries made by the scribe were at my, Dr. Rohit Warren MD, direction and personally dictated by me on 01/27/2023. I have reviewed the chart and agree that the record accurately reflects my personal performance of the history, physical exam, assessment and plan. I have also personally directed, reviewed, and agreed with the chart.

## 2023-01-27 NOTE — ASSESSMENT
[FreeTextEntry1] : Annual Physical Exam\par -Blood work done today\par -Check A1c, lipid panel and Vitamin levels.\par -BP is stable. Continue current management.\par -Repeat CBC and flow cytometry.\par -Discussed importance of healthy diet.\par -RTO annually or as needed

## 2023-01-27 NOTE — HEALTH RISK ASSESSMENT
[Good] : ~his/her~  mood as  good [Never] : Never [No] : In the past 12 months have you used drugs other than those required for medical reasons? No [FreeTextEntry1] : Health Maintenance

## 2023-01-31 LAB
25(OH)D3 SERPL-MCNC: 26.4 NG/ML
ALBUMIN SERPL ELPH-MCNC: 4.5 G/DL
ALP BLD-CCNC: 91 U/L
ALT SERPL-CCNC: 16 U/L
ANION GAP SERPL CALC-SCNC: 11 MMOL/L
APPEARANCE: CLEAR
AST SERPL-CCNC: 17 U/L
BACTERIA: NEGATIVE
BASOPHILS # BLD AUTO: 0.03 K/UL
BASOPHILS NFR BLD AUTO: 0.5 %
BILIRUB SERPL-MCNC: 0.4 MG/DL
BILIRUBIN URINE: NEGATIVE
BLOOD URINE: ABNORMAL
BUN SERPL-MCNC: 24 MG/DL
CALCIUM SERPL-MCNC: 9.9 MG/DL
CHLORIDE SERPL-SCNC: 102 MMOL/L
CHOLEST SERPL-MCNC: 129 MG/DL
CO2 SERPL-SCNC: 27 MMOL/L
COLOR: NORMAL
CREAT SERPL-MCNC: 0.9 MG/DL
EGFR: 67 ML/MIN/1.73M2
EOSINOPHIL # BLD AUTO: 0.17 K/UL
EOSINOPHIL NFR BLD AUTO: 2.7 %
ESTIMATED AVERAGE GLUCOSE: 120 MG/DL
GLUCOSE QUALITATIVE U: NEGATIVE
GLUCOSE SERPL-MCNC: 98 MG/DL
HBA1C MFR BLD HPLC: 5.8 %
HCT VFR BLD CALC: 45.3 %
HDLC SERPL-MCNC: 36 MG/DL
HGB BLD-MCNC: 14.6 G/DL
HYALINE CASTS: 1 /LPF
IMM GRANULOCYTES NFR BLD AUTO: 0.3 %
KETONES URINE: NEGATIVE
LDLC SERPL CALC-MCNC: 66 MG/DL
LEUKOCYTE ESTERASE URINE: NEGATIVE
LYMPHOCYTES # BLD AUTO: 1.51 K/UL
LYMPHOCYTES NFR BLD AUTO: 24.4 %
MAN DIFF?: NORMAL
MCHC RBC-ENTMCNC: 28.7 PG
MCHC RBC-ENTMCNC: 32.2 GM/DL
MCV RBC AUTO: 89.2 FL
MICROSCOPIC-UA: NORMAL
MONOCYTES # BLD AUTO: 0.55 K/UL
MONOCYTES NFR BLD AUTO: 8.9 %
NEUTROPHILS # BLD AUTO: 3.92 K/UL
NEUTROPHILS NFR BLD AUTO: 63.2 %
NITRITE URINE: NEGATIVE
NONHDLC SERPL-MCNC: 92 MG/DL
PH URINE: 5.5
PLATELET # BLD AUTO: 187 K/UL
POTASSIUM SERPL-SCNC: 5.2 MMOL/L
PROT SERPL-MCNC: 7.1 G/DL
PROTEIN URINE: NEGATIVE
RBC # BLD: 5.08 M/UL
RBC # FLD: 14.6 %
RED BLOOD CELLS URINE: 6 /HPF
SODIUM SERPL-SCNC: 140 MMOL/L
SPECIFIC GRAVITY URINE: 1.01
SQUAMOUS EPITHELIAL CELLS: 4 /HPF
TRIGL SERPL-MCNC: 130 MG/DL
TSH SERPL-ACNC: 2.65 UIU/ML
UROBILINOGEN URINE: NORMAL
VIT B12 SERPL-MCNC: 462 PG/ML
WBC # FLD AUTO: 6.2 K/UL
WHITE BLOOD CELLS URINE: 1 /HPF

## 2023-01-31 NOTE — REVIEW OF SYSTEMS
[Feeling Fatigued] : feeling fatigued [Dyspnea on exertion] : dyspnea during exertion [Joint Pain] : joint pain [Negative] : Heme/Lymph [Fever] : no fever [Weight Gain (___ Lbs)] : no recent weight gain [Chills] : no chills [Weight Loss (___ Lbs)] : no recent weight loss [SOB] : no shortness of breath [Chest Discomfort] : no chest discomfort [Palpitations] : no palpitations [Orthopnea] : no orthopnea [Syncope] : no syncope [Cough] : no cough [Under Stress] : not under stress

## 2023-01-31 NOTE — PHYSICAL EXAM
[Well Developed] : well developed [Well Nourished] : well nourished [No Acute Distress] : no acute distress [5th Left ICS - MCL] : palpated at the 5th LICS in the midclavicular line [Normal Rate] : normal [Rhythm Regular] : regular [Normal S1] : normal S1 [Normal S2] : normal S2 [No Gallop] : no gallop heard [Clear Lung Fields] : clear lung fields [Good Air Entry] : good air entry [No Respiratory Distress] : no respiratory distress  [No Edema] : no edema [No Rash] : no rash [Moves all extremities] : moves all extremities [Alert and Oriented] : alert and oriented [No Focal Deficits] : no focal deficits [Click] : no click

## 2023-01-31 NOTE — CARDIOLOGY SUMMARY
[de-identified] : 1/11/23 NSR at 75bpm with LBBB (on prior EKG in system)\par 1/25/2022 BSR at 77bpm with LBBB

## 2023-01-31 NOTE — HISTORY OF PRESENT ILLNESS
[FreeTextEntry1] : 77 year old female with HTN, HLD and atrial fibrillation s/p ablation 12/2022, who presents for follow up.\par \par She came to [facility] ER 12/2022 with palpitations and SOB found to be in afib.  Echo showed normal EF.  She was ultimately ablated with a pulmonary vein isolation.  She was discharged on Eliquis and Toprol.  She noted fatigue and palpitations with exertion since her ablation.  States her heart rate at the time is in the 90s.  She went to the ER because she was concerned about her blood pressure and was in normal sinus rhythm.  At her last visit she has blood work with normal results and BNP WNL.  \par No syncope, orthopnea, PND, edema or chest pain.  She is compliant with Eliquis.  \par  \par

## 2023-01-31 NOTE — DISCUSSION/SUMMARY
[EKG obtained to assist in diagnosis and management of assessed problem(s)] : EKG obtained to assist in diagnosis and management of assessed problem(s) [FreeTextEntry1] : 77 year old female with HTN, HLD and atrial fibrillation s/p ablation 12/2022, who presents for follow up.  She is in NSR today on exam and EKG.  She is almost a month post ablation and we again discussed she is still in the blanking period and it is possible she is having episodes of afib.  I have asked her to increase her Metoprolol and wear an event monitor to see if her symptoms correlate with recurrent arrhythmia.  She remains on Eliquis and the rest of her medications.  She knows to call with any questions or concerns.  Number Of Freeze-Thaw Cycles: 1 freeze-thaw cycle Total Number Of Aks Treated: 16 Duration Of Freeze Thaw-Cycle (Seconds): 8 Post-Care Instructions: I reviewed with the patient in detail post-care instructions. Patient is to wear sunprotection, and avoid picking at any of the treated lesions. Pt may apply Vaseline to crusted or scabbing areas. Detail Level: Simple Consent: The patient's consent was obtained including but not limited to risks of crusting, scabbing, blistering, scarring, darker or lighter pigmentary change, recurrence, incomplete removal and infection. Render Post-Care Instructions In Note?: no

## 2023-02-08 ENCOUNTER — APPOINTMENT (OUTPATIENT)
Dept: HEART AND VASCULAR | Facility: CLINIC | Age: 78
End: 2023-02-08
Payer: MEDICARE

## 2023-02-08 PROCEDURE — 93248 EXT ECG>7D<15D REV&INTERPJ: CPT

## 2023-03-01 ENCOUNTER — RESULT REVIEW (OUTPATIENT)
Age: 78
End: 2023-03-01

## 2023-03-01 ENCOUNTER — APPOINTMENT (OUTPATIENT)
Dept: MAMMOGRAPHY | Facility: CLINIC | Age: 78
End: 2023-03-01
Payer: MEDICARE

## 2023-03-01 PROCEDURE — 77063 BREAST TOMOSYNTHESIS BI: CPT

## 2023-03-01 PROCEDURE — 77067 SCR MAMMO BI INCL CAD: CPT

## 2023-12-20 ENCOUNTER — RX RENEWAL (OUTPATIENT)
Age: 78
End: 2023-12-20

## 2023-12-20 RX ORDER — METOPROLOL SUCCINATE 100 MG/1
100 TABLET, EXTENDED RELEASE ORAL
Qty: 180 | Refills: 3 | Status: ACTIVE | COMMUNITY
Start: 2023-01-11 | End: 1900-01-01

## 2023-12-27 ENCOUNTER — APPOINTMENT (OUTPATIENT)
Dept: HEART AND VASCULAR | Facility: CLINIC | Age: 78
End: 2023-12-27
Payer: MEDICARE

## 2023-12-27 ENCOUNTER — NON-APPOINTMENT (OUTPATIENT)
Age: 78
End: 2023-12-27

## 2023-12-27 VITALS
HEART RATE: 77 BPM | DIASTOLIC BLOOD PRESSURE: 72 MMHG | TEMPERATURE: 96.2 F | SYSTOLIC BLOOD PRESSURE: 159 MMHG | HEIGHT: 63 IN | WEIGHT: 222 LBS | BODY MASS INDEX: 39.34 KG/M2

## 2023-12-27 DIAGNOSIS — I48.0 PAROXYSMAL ATRIAL FIBRILLATION: ICD-10-CM

## 2023-12-27 DIAGNOSIS — I44.7 LEFT BUNDLE-BRANCH BLOCK, UNSPECIFIED: ICD-10-CM

## 2023-12-27 PROCEDURE — 93000 ELECTROCARDIOGRAM COMPLETE: CPT

## 2023-12-27 PROCEDURE — 99213 OFFICE O/P EST LOW 20 MIN: CPT | Mod: 25

## 2023-12-28 NOTE — ADDENDUM
No urinary symptoms    Continue  tamsulosin [FreeTextEntry1] :  I, Janette Selby, am scribing for and the presence of Dr. Sam the following sections: HPI, PMH,Family/social history, ROS, Physical Exam, Assessment / Plan.  I, Roosevelt Sam, personally performed the services described in the documentation, reviewed the documentation recorded by the scribe in my presence and it accurately and completely records my words and actions.

## 2023-12-28 NOTE — HISTORY OF PRESENT ILLNESS
[Paroxysmal Atrial Fibrillation] : paroxysmal atrial fibrillation [FreeTextEntry1] : 77 year old female with HTN, HLD and atrial fibrillation s/p ablation 12/2022, who presents for follow up. Patient states she feels well overall. She sometimes feels some palpitations, however overall feels well. Patient without other complaints at this time.  No syncope, orthopnea, PND, edema or chest pain. She is compliant with Eliquis. She complains of mild nose bleeds, but have resolved with pressure.   Hx: She came to [facility] ER 12/2022 with palpitations and SOB found to be in afib. Echo showed normal EF. She was ultimately ablated with a pulmonary vein isolation. She was discharged on Eliquis and Toprol. She noted fatigue and palpitations with exertion since her ablation. States her heart rate at the time is in the 90s. She went to the ER because she was concerned about her blood pressure and was in normal sinus rhythm. At her last visit she has blood work with normal results and BNP WNL.   [Palpitations] : no palpitations [Dyspnea] : no dyspnea [Chest Pain] : no chest pain [Dizziness] : no dizziness [Light-Headed] : no lightheadedness [Near Syncope] : no near syncope

## 2024-02-02 RX ORDER — ATORVASTATIN CALCIUM 20 MG/1
20 TABLET, FILM COATED ORAL DAILY
Qty: 90 | Refills: 3 | Status: ACTIVE | COMMUNITY
Start: 2018-09-19 | End: 2025-01-27

## 2024-02-12 ENCOUNTER — APPOINTMENT (OUTPATIENT)
Dept: INTERNAL MEDICINE | Facility: CLINIC | Age: 79
End: 2024-02-12

## 2024-02-14 ENCOUNTER — APPOINTMENT (OUTPATIENT)
Dept: INTERNAL MEDICINE | Facility: CLINIC | Age: 79
End: 2024-02-14
Payer: MEDICARE

## 2024-02-14 ENCOUNTER — LABORATORY RESULT (OUTPATIENT)
Age: 79
End: 2024-02-14

## 2024-02-14 VITALS
OXYGEN SATURATION: 95 % | HEART RATE: 72 BPM | BODY MASS INDEX: 39.51 KG/M2 | DIASTOLIC BLOOD PRESSURE: 76 MMHG | WEIGHT: 223 LBS | RESPIRATION RATE: 14 BRPM | TEMPERATURE: 97.8 F | SYSTOLIC BLOOD PRESSURE: 125 MMHG | HEIGHT: 63 IN

## 2024-02-14 DIAGNOSIS — Z00.00 ENCOUNTER FOR GENERAL ADULT MEDICAL EXAMINATION W/OUT ABNORMAL FINDINGS: ICD-10-CM

## 2024-02-14 PROCEDURE — G0439: CPT

## 2024-02-14 PROCEDURE — 36415 COLL VENOUS BLD VENIPUNCTURE: CPT

## 2024-02-14 RX ORDER — OLMESARTAN MEDOXOMIL 40 MG/1
40 TABLET, FILM COATED ORAL
Qty: 90 | Refills: 3 | Status: ACTIVE | COMMUNITY
Start: 2020-10-27 | End: 1900-01-01

## 2024-02-14 NOTE — ADDENDUM
[FreeTextEntry1] : I, Polina Andino, acted as a scribe on behalf of Dr. Rohit Warren MD, on 02/14/2024.   All medical entries made by the scribe were at my, Dr. Rohit Warren MD, direction and personally dictated by me on 02/14/2024. I have reviewed the chart and agree that the record accurately reflects my personal performance of the history, physical exam, assessment and plan. I have also personally directed, reviewed, and agreed with the chart.

## 2024-02-14 NOTE — HEALTH RISK ASSESSMENT
[Good] : ~his/her~  mood as  good [Yes] : Yes [No] : In the past 12 months have you used drugs other than those required for medical reasons? No [No falls in past year] : Patient reported no falls in the past year [Feels Safe at Home] : Feels safe at home [Fully functional (bathing, dressing, toileting, transferring, walking, feeding)] : Fully functional (bathing, dressing, toileting, transferring, walking, feeding) [Fully functional (using the telephone, shopping, preparing meals, housekeeping, doing laundry, using] : Fully functional and needs no help or supervision to perform IADLs (using the telephone, shopping, preparing meals, housekeeping, doing laundry, using transportation, managing medications and managing finances) [Reports normal functional visual acuity (ie: able to read med bottle)] : Reports normal functional visual acuity [Smoke Detector] : smoke detector [Carbon Monoxide Detector] : carbon monoxide detector [Safety elements used in home] : safety elements used in home [Seat Belt] :  uses seat belt [Sunscreen] : uses sunscreen [Never] : Never [FreeTextEntry1] : Health Maintenance [de-identified] : Cardio [Change in mental status noted] : No change in mental status noted [Language] : denies difficulty with language [Behavior] : denies difficulty with behavior [Learning/Retaining New Information] : denies difficulty learning/retaining new information [Handling Complex Tasks] : denies difficulty handling complex tasks [Reasoning] : denies difficulty with reasoning [Spatial Ability and Orientation] : denies difficulty with spatial ability and orientation [Reports changes in hearing] : Reports no changes in hearing [Reports changes in vision] : Reports no changes in vision [Reports changes in dental health] : Reports no changes in dental health [Guns at Home] : no guns at home [Travel to Developing Areas] : does not  travel to developing areas

## 2024-02-14 NOTE — HISTORY OF PRESENT ILLNESS
[FreeTextEntry1] : Patient presents for annual wellness visit. [de-identified] : Patient is doing well overall and has not gotten sick since last visit. She has followed with Cardio. Requesting for Olmesartan refills. Denies any CP, chest tightness or SOB. Denies any abdominal pain, urinary symptom or change in bowel habits. Denies any fever, chills or night sweats.

## 2024-02-14 NOTE — ASSESSMENT
[FreeTextEntry1] : Annual Wellness Visit -BP is stable. Continue current management. -Check A1c, lipid panel and Vitamin levels -Olmesartan refills provided. -Continue with Healthy diet. -Continue to f/u with Specialists. -RTO in 6 months.

## 2024-02-18 ENCOUNTER — NON-APPOINTMENT (OUTPATIENT)
Age: 79
End: 2024-02-18

## 2024-02-18 LAB
25(OH)D3 SERPL-MCNC: 22.2 NG/ML
ALBUMIN SERPL ELPH-MCNC: 4.4 G/DL
ALP BLD-CCNC: 90 U/L
ALT SERPL-CCNC: 20 U/L
ANION GAP SERPL CALC-SCNC: 11 MMOL/L
APPEARANCE: ABNORMAL
AST SERPL-CCNC: 21 U/L
BASOPHILS # BLD AUTO: 0.05 K/UL
BASOPHILS NFR BLD AUTO: 0.6 %
BILIRUB SERPL-MCNC: 0.5 MG/DL
BILIRUBIN URINE: NEGATIVE
BLOOD URINE: ABNORMAL
BUN SERPL-MCNC: 28 MG/DL
CALCIUM SERPL-MCNC: 9.5 MG/DL
CHLORIDE SERPL-SCNC: 102 MMOL/L
CHOLEST SERPL-MCNC: 127 MG/DL
CO2 SERPL-SCNC: 25 MMOL/L
COLOR: YELLOW
CREAT SERPL-MCNC: 0.86 MG/DL
EGFR: 70 ML/MIN/1.73M2
EOSINOPHIL # BLD AUTO: 0.18 K/UL
EOSINOPHIL NFR BLD AUTO: 2.1 %
ESTIMATED AVERAGE GLUCOSE: 120 MG/DL
GLUCOSE QUALITATIVE U: NEGATIVE MG/DL
GLUCOSE SERPL-MCNC: 86 MG/DL
HBA1C MFR BLD HPLC: 5.8 %
HCT VFR BLD CALC: 45.3 %
HDLC SERPL-MCNC: 35 MG/DL
HGB BLD-MCNC: 14.3 G/DL
IMM GRANULOCYTES NFR BLD AUTO: 0.2 %
KETONES URINE: NEGATIVE MG/DL
LDLC SERPL CALC-MCNC: 64 MG/DL
LEUKOCYTE ESTERASE URINE: ABNORMAL
LYMPHOCYTES # BLD AUTO: 1.57 K/UL
LYMPHOCYTES NFR BLD AUTO: 18.5 %
MAN DIFF?: NORMAL
MCHC RBC-ENTMCNC: 28.3 PG
MCHC RBC-ENTMCNC: 31.6 GM/DL
MCV RBC AUTO: 89.7 FL
MONOCYTES # BLD AUTO: 0.81 K/UL
MONOCYTES NFR BLD AUTO: 9.5 %
NEUTROPHILS # BLD AUTO: 5.87 K/UL
NEUTROPHILS NFR BLD AUTO: 69.1 %
NITRITE URINE: POSITIVE
NONHDLC SERPL-MCNC: 92 MG/DL
PH URINE: 5.5
PLATELET # BLD AUTO: 188 K/UL
POTASSIUM SERPL-SCNC: 4.9 MMOL/L
PROT SERPL-MCNC: 7 G/DL
PROTEIN URINE: NEGATIVE MG/DL
RBC # BLD: 5.05 M/UL
RBC # FLD: 14.9 %
SODIUM SERPL-SCNC: 138 MMOL/L
SPECIFIC GRAVITY URINE: 1.02
TRIGL SERPL-MCNC: 161 MG/DL
TSH SERPL-ACNC: 2.44 UIU/ML
UROBILINOGEN URINE: 0.2 MG/DL
VIT B12 SERPL-MCNC: 436 PG/ML
WBC # FLD AUTO: 8.5 K/UL

## 2024-02-27 NOTE — REASON FOR VISIT
Intubation    Date/Time: 2/27/2024 8:13 AM    Performed by: Hiren Patton CRNA  Authorized by: Jaci Bowie MD    Intubation:     Induction:  Intravenous    Intubated:  Postinduction    Mask Ventilation:  Easy mask    Attempts:  1    Attempted By:  Student    Method of Intubation:  Direct and video laryngoscopy    Blade:  Leigh 3    Laryngeal View Grade: Grade I - full view of cords      Difficult Airway Encountered?: No      Complications:  None    Airway Device:  Oral endotracheal tube    Airway Device Size:  7.5    Style/Cuff Inflation:  Cuffed (inflated to minimal occlusive pressure)    Tube secured:  21    Secured at:  The lips    Placement Verified By:  Capnometry    Complicating Factors:  None    Findings Post-Intubation:  BS equal bilateral and atraumatic/condition of teeth unchanged       [Annual Wellness Visit] : an annual wellness visit [FreeTextEntry1] : Subsequent Medicare wellness visit

## 2024-03-21 ENCOUNTER — APPOINTMENT (OUTPATIENT)
Dept: MAMMOGRAPHY | Facility: CLINIC | Age: 79
End: 2024-03-21
Payer: MEDICARE

## 2024-03-21 ENCOUNTER — APPOINTMENT (OUTPATIENT)
Dept: RADIOLOGY | Facility: CLINIC | Age: 79
End: 2024-03-21
Payer: MEDICARE

## 2024-03-21 PROCEDURE — 77063 BREAST TOMOSYNTHESIS BI: CPT

## 2024-03-21 PROCEDURE — 77067 SCR MAMMO BI INCL CAD: CPT

## 2024-03-21 PROCEDURE — 77080 DXA BONE DENSITY AXIAL: CPT

## 2024-05-11 NOTE — ED ADULT NURSE NOTE - BREATH SOUNDS, MLM
Clear
-likely due to low bicarb iso kidney disease  -no lactate, glucose wnl  -initially VBG around time patient started on BIPAP-> f/u repeat in AM  -if persistent kidney dysfunction, may benefit from bicarb tabs

## 2025-01-09 ENCOUNTER — APPOINTMENT (OUTPATIENT)
Dept: INTERNAL MEDICINE | Facility: CLINIC | Age: 80
End: 2025-01-09
Payer: MEDICARE

## 2025-01-09 PROCEDURE — G0008: CPT

## 2025-01-09 PROCEDURE — 90662 IIV NO PRSV INCREASED AG IM: CPT

## 2025-01-31 ENCOUNTER — APPOINTMENT (OUTPATIENT)
Dept: INTERNAL MEDICINE | Facility: CLINIC | Age: 80
End: 2025-01-31
Payer: MEDICARE

## 2025-01-31 ENCOUNTER — LABORATORY RESULT (OUTPATIENT)
Age: 80
End: 2025-01-31

## 2025-01-31 VITALS
HEIGHT: 60 IN | BODY MASS INDEX: 41.62 KG/M2 | SYSTOLIC BLOOD PRESSURE: 96 MMHG | TEMPERATURE: 96.4 F | OXYGEN SATURATION: 98 % | WEIGHT: 212 LBS | DIASTOLIC BLOOD PRESSURE: 62 MMHG | HEART RATE: 72 BPM

## 2025-01-31 VITALS — DIASTOLIC BLOOD PRESSURE: 72 MMHG | SYSTOLIC BLOOD PRESSURE: 124 MMHG

## 2025-01-31 DIAGNOSIS — I10 ESSENTIAL (PRIMARY) HYPERTENSION: ICD-10-CM

## 2025-01-31 DIAGNOSIS — Z87.898 PERSONAL HISTORY OF OTHER SPECIFIED CONDITIONS: ICD-10-CM

## 2025-01-31 DIAGNOSIS — Z86.2 PERSONAL HISTORY OF DISEASES OF THE BLOOD AND BLOOD-FORMING ORGANS AND CERTAIN DISORDERS INVOLVING THE IMMUNE MECHANISM: ICD-10-CM

## 2025-01-31 DIAGNOSIS — Z86.19 PERSONAL HISTORY OF OTHER INFECTIOUS AND PARASITIC DISEASES: ICD-10-CM

## 2025-01-31 DIAGNOSIS — Z86.79 PERSONAL HISTORY OF OTHER DISEASES OF THE CIRCULATORY SYSTEM: ICD-10-CM

## 2025-01-31 DIAGNOSIS — I48.0 PAROXYSMAL ATRIAL FIBRILLATION: ICD-10-CM

## 2025-01-31 DIAGNOSIS — Z86.39 PERSONAL HISTORY OF OTHER ENDOCRINE, NUTRITIONAL AND METABOLIC DISEASE: ICD-10-CM

## 2025-01-31 DIAGNOSIS — Z23 ENCOUNTER FOR IMMUNIZATION: ICD-10-CM

## 2025-01-31 DIAGNOSIS — G44.86 CERVICOGENIC HEADACHE: ICD-10-CM

## 2025-01-31 LAB
BASOPHILS # BLD AUTO: 0.04 K/UL
BASOPHILS NFR BLD AUTO: 0.6 %
EOSINOPHIL # BLD AUTO: 0.11 K/UL
EOSINOPHIL NFR BLD AUTO: 1.6 %
HCT VFR BLD CALC: 46.1 %
HGB BLD-MCNC: 14.3 G/DL
IMM GRANULOCYTES NFR BLD AUTO: 0.3 %
LYMPHOCYTES # BLD AUTO: 1.27 K/UL
LYMPHOCYTES NFR BLD AUTO: 18.5 %
MAN DIFF?: NORMAL
MCHC RBC-ENTMCNC: 28.1 PG
MCHC RBC-ENTMCNC: 31 G/DL
MCV RBC AUTO: 90.7 FL
MONOCYTES # BLD AUTO: 0.56 K/UL
MONOCYTES NFR BLD AUTO: 8.2 %
NEUTROPHILS # BLD AUTO: 4.85 K/UL
NEUTROPHILS NFR BLD AUTO: 70.8 %
PLATELET # BLD AUTO: 186 K/UL
RBC # BLD: 5.08 M/UL
RBC # FLD: 14.6 %
WBC # FLD AUTO: 6.85 K/UL

## 2025-01-31 PROCEDURE — G2211 COMPLEX E/M VISIT ADD ON: CPT

## 2025-01-31 PROCEDURE — 99213 OFFICE O/P EST LOW 20 MIN: CPT

## 2025-01-31 PROCEDURE — 36415 COLL VENOUS BLD VENIPUNCTURE: CPT

## 2025-02-01 LAB
25(OH)D3 SERPL-MCNC: 27.8 NG/ML
ALBUMIN SERPL ELPH-MCNC: 4.3 G/DL
ALP BLD-CCNC: 82 U/L
ALT SERPL-CCNC: 16 U/L
ANION GAP SERPL CALC-SCNC: 11 MMOL/L
APPEARANCE: CLEAR
AST SERPL-CCNC: 17 U/L
BILIRUB SERPL-MCNC: 0.5 MG/DL
BILIRUBIN URINE: NEGATIVE
BLOOD URINE: ABNORMAL
BUN SERPL-MCNC: 28 MG/DL
CALCIUM SERPL-MCNC: 9.8 MG/DL
CHLORIDE SERPL-SCNC: 102 MMOL/L
CHOLEST SERPL-MCNC: 134 MG/DL
CO2 SERPL-SCNC: 26 MMOL/L
COLOR: YELLOW
CREAT SERPL-MCNC: 1.01 MG/DL
EGFR: 57 ML/MIN/1.73M2
ESTIMATED AVERAGE GLUCOSE: 128 MG/DL
GLUCOSE QUALITATIVE U: NEGATIVE MG/DL
GLUCOSE SERPL-MCNC: 91 MG/DL
HBA1C MFR BLD HPLC: 6.1 %
HDLC SERPL-MCNC: 38 MG/DL
KETONES URINE: NEGATIVE MG/DL
LDLC SERPL CALC-MCNC: 80 MG/DL
LEUKOCYTE ESTERASE URINE: ABNORMAL
NITRITE URINE: POSITIVE
NONHDLC SERPL-MCNC: 97 MG/DL
PH URINE: 5.5
POTASSIUM SERPL-SCNC: 5.2 MMOL/L
PROT SERPL-MCNC: 6.9 G/DL
PROTEIN URINE: NEGATIVE MG/DL
SODIUM SERPL-SCNC: 140 MMOL/L
SPECIFIC GRAVITY URINE: 1.02
TRIGL SERPL-MCNC: 86 MG/DL
TSH SERPL-ACNC: 2.72 UIU/ML
UROBILINOGEN URINE: 0.2 MG/DL
VIT B12 SERPL-MCNC: 444 PG/ML

## 2025-02-18 ENCOUNTER — APPOINTMENT (OUTPATIENT)
Dept: HEART AND VASCULAR | Facility: CLINIC | Age: 80
End: 2025-02-18

## 2025-02-18 ENCOUNTER — APPOINTMENT (OUTPATIENT)
Dept: HEART AND VASCULAR | Facility: CLINIC | Age: 80
End: 2025-02-18
Payer: MEDICARE

## 2025-02-18 ENCOUNTER — NON-APPOINTMENT (OUTPATIENT)
Age: 80
End: 2025-02-18

## 2025-02-18 VITALS
WEIGHT: 212 LBS | TEMPERATURE: 96.3 F | BODY MASS INDEX: 41.62 KG/M2 | HEIGHT: 60 IN | SYSTOLIC BLOOD PRESSURE: 140 MMHG | HEART RATE: 68 BPM | DIASTOLIC BLOOD PRESSURE: 73 MMHG

## 2025-02-18 PROCEDURE — 93000 ELECTROCARDIOGRAM COMPLETE: CPT

## 2025-02-18 PROCEDURE — 99213 OFFICE O/P EST LOW 20 MIN: CPT

## 2025-02-18 PROCEDURE — G2211 COMPLEX E/M VISIT ADD ON: CPT

## 2025-02-18 RX ORDER — ATORVASTATIN CALCIUM 80 MG/1
TABLET, FILM COATED ORAL
Refills: 0 | Status: ACTIVE | COMMUNITY

## 2025-03-11 PROCEDURE — 93246 EXT ECG>7D<15D RECORDING: CPT

## 2025-03-26 ENCOUNTER — APPOINTMENT (OUTPATIENT)
Dept: MAMMOGRAPHY | Facility: CLINIC | Age: 80
End: 2025-03-26
Payer: MEDICARE

## 2025-03-26 PROCEDURE — 77063 BREAST TOMOSYNTHESIS BI: CPT

## 2025-03-26 PROCEDURE — 77067 SCR MAMMO BI INCL CAD: CPT

## 2025-04-07 PROCEDURE — 93248 EXT ECG>7D<15D REV&INTERPJ: CPT

## 2025-06-10 ENCOUNTER — EMERGENCY (EMERGENCY)
Facility: HOSPITAL | Age: 80
LOS: 1 days | End: 2025-06-10
Attending: EMERGENCY MEDICINE | Admitting: EMERGENCY MEDICINE
Payer: MEDICARE

## 2025-06-10 VITALS
OXYGEN SATURATION: 98 % | DIASTOLIC BLOOD PRESSURE: 83 MMHG | RESPIRATION RATE: 18 BRPM | SYSTOLIC BLOOD PRESSURE: 141 MMHG | HEART RATE: 64 BPM | TEMPERATURE: 98 F

## 2025-06-10 VITALS
DIASTOLIC BLOOD PRESSURE: 79 MMHG | SYSTOLIC BLOOD PRESSURE: 148 MMHG | OXYGEN SATURATION: 97 % | TEMPERATURE: 99 F | HEART RATE: 75 BPM | HEIGHT: 62 IN | RESPIRATION RATE: 18 BRPM | WEIGHT: 195.11 LBS

## 2025-06-10 DIAGNOSIS — Z96.653 PRESENCE OF ARTIFICIAL KNEE JOINT, BILATERAL: Chronic | ICD-10-CM

## 2025-06-10 LAB
ADD ON TEST-SPECIMEN IN LAB: SIGNIFICANT CHANGE UP
ANION GAP SERPL CALC-SCNC: 15 MMOL/L — SIGNIFICANT CHANGE UP (ref 5–17)
APPEARANCE UR: CLEAR — SIGNIFICANT CHANGE UP
BASOPHILS # BLD AUTO: 0.04 K/UL — SIGNIFICANT CHANGE UP (ref 0–0.2)
BASOPHILS NFR BLD AUTO: 0.4 % — SIGNIFICANT CHANGE UP (ref 0–2)
BILIRUB UR-MCNC: NEGATIVE — SIGNIFICANT CHANGE UP
BUN SERPL-MCNC: 21 MG/DL — SIGNIFICANT CHANGE UP (ref 7–23)
CALCIUM SERPL-MCNC: 10 MG/DL — SIGNIFICANT CHANGE UP (ref 8.4–10.5)
CHLORIDE SERPL-SCNC: 103 MMOL/L — SIGNIFICANT CHANGE UP (ref 96–108)
CO2 SERPL-SCNC: 21 MMOL/L — LOW (ref 22–31)
COLOR SPEC: YELLOW — SIGNIFICANT CHANGE UP
CREAT SERPL-MCNC: 0.83 MG/DL — SIGNIFICANT CHANGE UP (ref 0.5–1.3)
DIFF PNL FLD: ABNORMAL
EGFR: 72 ML/MIN/1.73M2 — SIGNIFICANT CHANGE UP
EGFR: 72 ML/MIN/1.73M2 — SIGNIFICANT CHANGE UP
EOSINOPHIL # BLD AUTO: 0.12 K/UL — SIGNIFICANT CHANGE UP (ref 0–0.5)
EOSINOPHIL NFR BLD AUTO: 1.2 % — SIGNIFICANT CHANGE UP (ref 0–6)
FLUAV AG NPH QL: SIGNIFICANT CHANGE UP
FLUBV AG NPH QL: SIGNIFICANT CHANGE UP
GLUCOSE SERPL-MCNC: 86 MG/DL — SIGNIFICANT CHANGE UP (ref 70–99)
GLUCOSE UR QL: NEGATIVE MG/DL — SIGNIFICANT CHANGE UP
HCT VFR BLD CALC: 45.9 % — HIGH (ref 34.5–45)
HGB BLD-MCNC: 15.2 G/DL — SIGNIFICANT CHANGE UP (ref 11.5–15.5)
IMM GRANULOCYTES NFR BLD AUTO: 0.2 % — SIGNIFICANT CHANGE UP (ref 0–0.9)
KETONES UR QL: NEGATIVE MG/DL — SIGNIFICANT CHANGE UP
LEUKOCYTE ESTERASE UR-ACNC: ABNORMAL
LYMPHOCYTES # BLD AUTO: 1.7 K/UL — SIGNIFICANT CHANGE UP (ref 1–3.3)
LYMPHOCYTES # BLD AUTO: 17.6 % — SIGNIFICANT CHANGE UP (ref 13–44)
MCHC RBC-ENTMCNC: 29.1 PG — SIGNIFICANT CHANGE UP (ref 27–34)
MCHC RBC-ENTMCNC: 33.1 G/DL — SIGNIFICANT CHANGE UP (ref 32–36)
MCV RBC AUTO: 87.8 FL — SIGNIFICANT CHANGE UP (ref 80–100)
MONOCYTES # BLD AUTO: 0.84 K/UL — SIGNIFICANT CHANGE UP (ref 0–0.9)
MONOCYTES NFR BLD AUTO: 8.7 % — SIGNIFICANT CHANGE UP (ref 2–14)
NEUTROPHILS # BLD AUTO: 6.96 K/UL — SIGNIFICANT CHANGE UP (ref 1.8–7.4)
NEUTROPHILS NFR BLD AUTO: 71.9 % — SIGNIFICANT CHANGE UP (ref 43–77)
NITRITE UR-MCNC: POSITIVE
NRBC BLD AUTO-RTO: 0 /100 WBCS — SIGNIFICANT CHANGE UP (ref 0–0)
PH UR: 6 — SIGNIFICANT CHANGE UP (ref 5–8)
PLATELET # BLD AUTO: 212 K/UL — SIGNIFICANT CHANGE UP (ref 150–400)
POTASSIUM SERPL-MCNC: 4.7 MMOL/L — SIGNIFICANT CHANGE UP (ref 3.5–5.3)
POTASSIUM SERPL-SCNC: 4.7 MMOL/L — SIGNIFICANT CHANGE UP (ref 3.5–5.3)
PROT UR-MCNC: NEGATIVE MG/DL — SIGNIFICANT CHANGE UP
RBC # BLD: 5.23 M/UL — HIGH (ref 3.8–5.2)
RBC # FLD: 14.5 % — SIGNIFICANT CHANGE UP (ref 10.3–14.5)
RSV RNA NPH QL NAA+NON-PROBE: SIGNIFICANT CHANGE UP
SARS-COV-2 RNA SPEC QL NAA+PROBE: SIGNIFICANT CHANGE UP
SODIUM SERPL-SCNC: 139 MMOL/L — SIGNIFICANT CHANGE UP (ref 135–145)
SOURCE RESPIRATORY: SIGNIFICANT CHANGE UP
SP GR SPEC: 1.01 — SIGNIFICANT CHANGE UP (ref 1–1.03)
TROPONIN T, HIGH SENSITIVITY RESULT: 17 NG/L — SIGNIFICANT CHANGE UP (ref 0–51)
TROPONIN T, HIGH SENSITIVITY RESULT: 17 NG/L — SIGNIFICANT CHANGE UP (ref 0–51)
UROBILINOGEN FLD QL: 0.2 MG/DL — SIGNIFICANT CHANGE UP (ref 0.2–1)
WBC # BLD: 9.68 K/UL — SIGNIFICANT CHANGE UP (ref 3.8–10.5)
WBC # FLD AUTO: 9.68 K/UL — SIGNIFICANT CHANGE UP (ref 3.8–10.5)

## 2025-06-10 PROCEDURE — 71045 X-RAY EXAM CHEST 1 VIEW: CPT

## 2025-06-10 PROCEDURE — 87186 SC STD MICRODIL/AGAR DIL: CPT

## 2025-06-10 PROCEDURE — 36415 COLL VENOUS BLD VENIPUNCTURE: CPT

## 2025-06-10 PROCEDURE — 80048 BASIC METABOLIC PNL TOTAL CA: CPT

## 2025-06-10 PROCEDURE — 84484 ASSAY OF TROPONIN QUANT: CPT

## 2025-06-10 PROCEDURE — 83880 ASSAY OF NATRIURETIC PEPTIDE: CPT

## 2025-06-10 PROCEDURE — 87637 SARSCOV2&INF A&B&RSV AMP PRB: CPT

## 2025-06-10 PROCEDURE — 96374 THER/PROPH/DIAG INJ IV PUSH: CPT

## 2025-06-10 PROCEDURE — 71045 X-RAY EXAM CHEST 1 VIEW: CPT | Mod: 26

## 2025-06-10 PROCEDURE — 99284 EMERGENCY DEPT VISIT MOD MDM: CPT

## 2025-06-10 PROCEDURE — 85025 COMPLETE CBC W/AUTO DIFF WBC: CPT

## 2025-06-10 PROCEDURE — 99284 EMERGENCY DEPT VISIT MOD MDM: CPT | Mod: 25

## 2025-06-10 PROCEDURE — 87086 URINE CULTURE/COLONY COUNT: CPT

## 2025-06-10 PROCEDURE — 81001 URINALYSIS AUTO W/SCOPE: CPT

## 2025-06-10 RX ORDER — CEFPODOXIME PROXETIL 200 MG/1
1 TABLET, FILM COATED ORAL
Qty: 12 | Refills: 0
Start: 2025-06-10 | End: 2025-06-15

## 2025-06-10 RX ORDER — CEFTRIAXONE 500 MG/1
2000 INJECTION, POWDER, FOR SOLUTION INTRAMUSCULAR; INTRAVENOUS ONCE
Refills: 0 | Status: COMPLETED | OUTPATIENT
Start: 2025-06-10 | End: 2025-06-10

## 2025-06-10 RX ADMIN — Medication 500 MILLILITER(S): at 18:04

## 2025-06-10 RX ADMIN — CEFTRIAXONE 100 MILLIGRAM(S): 500 INJECTION, POWDER, FOR SOLUTION INTRAMUSCULAR; INTRAVENOUS at 18:04

## 2025-06-10 NOTE — ED PROVIDER NOTE - CLINICAL SUMMARY MEDICAL DECISION MAKING FREE TEXT BOX
YOU HAVE A URINARY TRACT INFECTION.  YOU WERE GIVEN ANTIBIOTICS IN THE EMERGENCY ROOM AND WILL NEED TO TAKE THEM FOR A WEEK TOTAL.  START TOMORROW.    FOLLOW UP WITH YOUR CARDIOLOGIST FOR THE MONITOR    RETURN TO THE EMERGENCY ROOM IMMEDIATELY FOR:  FEVER  VOMITING  BACK PAIN  CHEST PAIN  TROUBLE BREATHING  YOU FEEL WEAK OR DIZZY  YOU ARE SHORT OF BREATH WHEN WALKING  ANY NEW, WORSENING OR CONCERNING SYMPTOMS

## 2025-06-10 NOTE — ED PROVIDER NOTE - OBJECTIVE STATEMENT
79 yo F PMH afib (ablated, on eliquis), HLD, HTN reporting fatigue and tachycardia with activity.  Patient noted on Sunday night that after walking 2-3 blocks her HR was 115 and stayed high for a few hours.  She has since then been unusually tired with activity and HR increases to the 90s.  She has had mild nausea.  No MONTGOMERY, CP.  Mild pain in the left upper back.  No leg pain or swelling.  No f/c/n/v/d, cough or URI symptoms.  She thinks she has been well-hydrated and has not had abd pain, vomiting or diarrhea.  Nuclear stress one year ago negative.

## 2025-06-10 NOTE — ED PROVIDER NOTE - PHYSICAL EXAMINATION
General:  No resp distress, mild pallor  HEENT:  No conjunctival injection, neck supple, no congestion   Chest:  Non-tender, no crepitance  Lungs:  Clear to auscultation bilaterally   Heart:  s1s2 normal, no murmur  Abdomen:  soft, non-tender, non-distended  :  Deferred  Rectal:  Deferred  Extremities: No edema, normal perfusion, no calf or joint swelling or tenderness  Neuro:  Alert, conversant, motor/sensory grossly intact

## 2025-06-10 NOTE — ED PROVIDER NOTE - PATIENT PORTAL LINK FT
You can access the FollowMyHealth Patient Portal offered by [facility] by registering at the following website: [email or web address]. By joining [facility]’s FollowMyHealth portal, you will also be able to view your health information using other applications (apps) compatible with our system.

## 2025-06-10 NOTE — ED ADULT NURSE NOTE - OBJECTIVE STATEMENT
80yF presents to the ED with reports of weakness. pt reports x2days of intermittent chest pain, fatigue and weakness.   A&Ox4. Breathing spontaneously and unlabored on room air. 80yF presents to the ED with reports of weakness. pt reports x2days of intermittent chest pain, intermittent nausea, fatigue and weakness. pt reports had an episode on Sunday night where HR increased to 115. states since then has had occur ance of symptoms. reports intermittent midsternal chest pain, states radiated to L shoulder this morning but currently denies at rest, only reports pain when "pushing on my chestbone". A&Ox4. Breathing spontaneously and unlabored on room air. denies SOB, fevers/chills, vomiting, diarrhea/constipation, HA, vision changes.

## 2025-06-10 NOTE — ED PROVIDER NOTE - NSFOLLOWUPINSTRUCTIONS_ED_ALL_ED_FT
81 yo F PMH afib (ablated, on eliquis), HLD, HTN reporting fatigue and tachycardia with activity.  Patient noted on Sunday night that after walking 2-3 blocks her HR was 115 and stayed high for a few hours.  She has since then been unusually tired with activity and HR increases to the 90s.  She has had mild nausea.  No MONTGOMERY, CP.  Mild pain in the left upper back.  No leg pain or swelling.  No f/c/n/v/d, cough or URI symptoms.  She thinks she has been well-hydrated and has not had abd pain, vomiting or diarrhea.  Nuclear stress one year ago negative.    DDx:  Recurrent afib, dehydration, infection, electrolyte imbalance, ACS.  EKG without ischemic changes and nuclear stress within past year negative and so ACS less likely.  Will assess further with labs, XR, orthostatic vs and d/w EP.    Spoke with patient's EP Dr. GRIFFITH who called the patient in the ER and who will arrange outpatient monitoring for her.  Did not think patient required admission unless symptoms worsened or patient not comfortable with outpatient plan.  Patient felt much better after fluids.  We discussed her results and when I explained the UA she recalled that for the past week she has been having urinary frequency.  I had her ambulate around the department and she did not have recurrent fatigue or tachycardia.  Her color had improved after fluids and so her symptoms are more likely secondary to infection/dehydration than a cardiac event, although underlying cardiac etiology still needs to be considered.  As patient was feeling better and will be in contact with her cardiologist in the morning we planned together for discharge.  We reviewed dx, treatment, f/u plan and detailed return precautions.  Patient verbalized understanding of the discussion and plan.

## 2025-06-10 NOTE — ED ADULT TRIAGE NOTE - CHIEF COMPLAINT QUOTE
Pt presents to ED here for feeling tired, weak and intermittent chest pain since Sunday night. Pt has hx of ablation on Eliquis. Denies CP at present. EKG in prog. Pt a&Ox4, NAD and conversive in full sentences and ambulatory.

## 2025-06-10 NOTE — ED ADULT NURSE NOTE - NSFALLRISKINTERV_ED_ALL_ED

## 2025-06-13 DIAGNOSIS — Z79.01 LONG TERM (CURRENT) USE OF ANTICOAGULANTS: ICD-10-CM

## 2025-06-13 DIAGNOSIS — I48.91 UNSPECIFIED ATRIAL FIBRILLATION: ICD-10-CM

## 2025-06-13 DIAGNOSIS — R53.1 WEAKNESS: ICD-10-CM

## 2025-06-13 DIAGNOSIS — M54.89 OTHER DORSALGIA: ICD-10-CM

## 2025-06-13 DIAGNOSIS — E78.5 HYPERLIPIDEMIA, UNSPECIFIED: ICD-10-CM

## 2025-06-13 DIAGNOSIS — R00.2 PALPITATIONS: ICD-10-CM

## 2025-06-13 DIAGNOSIS — N39.0 URINARY TRACT INFECTION, SITE NOT SPECIFIED: ICD-10-CM

## 2025-06-13 DIAGNOSIS — I10 ESSENTIAL (PRIMARY) HYPERTENSION: ICD-10-CM

## 2025-06-16 NOTE — H&P ADULT - ASSESSMENT
Head, normocephalic, atraumatic, Face, Face within normal limits, Ears, External ears within normal limits, Nose/Nasopharynx, External nose  normal appearance, nares patent, no nasal discharge, Mouth and Throat, Oral cavity appearance normal, Breath odor normal, Lips, Appearance normal , Head , normocephalic , atraumatic , Face , Face within normal limits , Ears , External ears within normal limits , Nose/Nasopharynx , External nose  normal appearance , Mouth and Throat , Oral cavity appearance normal
73 yr old obese F with PMHx of HTN, hyperlipidemia, presents to [facility] ED 6/19/18 with exertional angina, EKG with new LBBB, cardiac enzymes negative x 1 set, [facility], serial cardiac enzymes and further work-up.